# Patient Record
Sex: FEMALE | Race: WHITE | NOT HISPANIC OR LATINO | Employment: FULL TIME | URBAN - METROPOLITAN AREA
[De-identification: names, ages, dates, MRNs, and addresses within clinical notes are randomized per-mention and may not be internally consistent; named-entity substitution may affect disease eponyms.]

---

## 2021-06-08 ENCOUNTER — HOSPITAL ENCOUNTER (EMERGENCY)
Facility: CLINIC | Age: 61
Discharge: HOME | End: 2021-06-08
Attending: EMERGENCY MEDICINE | Admitting: EMERGENCY MEDICINE
Payer: COMMERCIAL

## 2021-06-08 ENCOUNTER — APPOINTMENT (OUTPATIENT)
Dept: GENERAL RADIOLOGY | Facility: CLINIC | Age: 61
End: 2021-06-08
Attending: EMERGENCY MEDICINE
Payer: COMMERCIAL

## 2021-06-08 ENCOUNTER — TELEPHONE (OUTPATIENT)
Dept: ORTHOPEDICS | Facility: CLINIC | Age: 61
End: 2021-06-08

## 2021-06-08 VITALS
DIASTOLIC BLOOD PRESSURE: 86 MMHG | TEMPERATURE: 98.1 F | WEIGHT: 160 LBS | RESPIRATION RATE: 16 BRPM | OXYGEN SATURATION: 98 % | HEART RATE: 59 BPM | SYSTOLIC BLOOD PRESSURE: 136 MMHG
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
## 2021-06-08 LAB
ANION GAP SERPL CALCULATED.3IONS-SCNC: 6 MMOL/L (ref 3–14)
BASOPHILS # BLD AUTO: 0 10E9/L (ref 0–0.2)
BASOPHILS NFR BLD AUTO: 0.2 %
BUN SERPL-MCNC: 18 MG/DL (ref 7–30)
CALCIUM SERPL-MCNC: 9.3 MG/DL (ref 8.5–10.1)
CHLORIDE SERPL-SCNC: 108 MMOL/L (ref 94–109)
CO2 SERPL-SCNC: 22 MMOL/L (ref 20–32)
CREAT SERPL-MCNC: 0.72 MG/DL (ref 0.52–1.04)
DIFFERENTIAL METHOD BLD: NORMAL
EOSINOPHIL # BLD AUTO: 0 10E9/L (ref 0–0.7)
EOSINOPHIL NFR BLD AUTO: 0.3 %
ERYTHROCYTE [DISTWIDTH] IN BLOOD BY AUTOMATED COUNT: 11.9 % (ref 10–15)
GFR SERPL CREATININE-BSD FRML MDRD: >90 ML/MIN/{1.73_M2}
GLUCOSE SERPL-MCNC: 102 MG/DL (ref 70–99)
HCT VFR BLD AUTO: 39.2 % (ref 35–47)
HGB BLD-MCNC: 13.1 G/DL (ref 11.7–15.7)
IMM GRANULOCYTES # BLD: 0 10E9/L (ref 0–0.4)
IMM GRANULOCYTES NFR BLD: 0.3 %
LYMPHOCYTES # BLD AUTO: 2.7 10E9/L (ref 0.8–5.3)
LYMPHOCYTES NFR BLD AUTO: 30.1 %
MCH RBC QN AUTO: 31.3 PG (ref 26.5–33)
MCHC RBC AUTO-ENTMCNC: 33.4 G/DL (ref 31.5–36.5)
MCV RBC AUTO: 94 FL (ref 78–100)
MONOCYTES # BLD AUTO: 0.6 10E9/L (ref 0–1.3)
MONOCYTES NFR BLD AUTO: 6.6 %
NEUTROPHILS # BLD AUTO: 5.6 10E9/L (ref 1.6–8.3)
NEUTROPHILS NFR BLD AUTO: 62.5 %
NRBC # BLD AUTO: 0 10*3/UL
NRBC BLD AUTO-RTO: 0 /100
PLATELET # BLD AUTO: 232 10E9/L (ref 150–450)
POTASSIUM SERPL-SCNC: 3.9 MMOL/L (ref 3.4–5.3)
RBC # BLD AUTO: 4.19 10E12/L (ref 3.8–5.2)
SODIUM SERPL-SCNC: 136 MMOL/L (ref 133–144)
WBC # BLD AUTO: 8.9 10E9/L (ref 4–11)

## 2021-06-08 PROCEDURE — 250N000011 HC RX IP 250 OP 636: Performed by: EMERGENCY MEDICINE

## 2021-06-08 PROCEDURE — 85025 COMPLETE CBC W/AUTO DIFF WBC: CPT | Performed by: EMERGENCY MEDICINE

## 2021-06-08 PROCEDURE — 73140 X-RAY EXAM OF FINGER(S): CPT | Mod: 26 | Performed by: RADIOLOGY

## 2021-06-08 PROCEDURE — 80048 BASIC METABOLIC PNL TOTAL CA: CPT | Performed by: EMERGENCY MEDICINE

## 2021-06-08 PROCEDURE — 96365 THER/PROPH/DIAG IV INF INIT: CPT | Performed by: EMERGENCY MEDICINE

## 2021-06-08 PROCEDURE — 73140 X-RAY EXAM OF FINGER(S): CPT | Mod: RT

## 2021-06-08 PROCEDURE — 99284 EMERGENCY DEPT VISIT MOD MDM: CPT | Mod: 25 | Performed by: EMERGENCY MEDICINE

## 2021-06-08 PROCEDURE — 99284 EMERGENCY DEPT VISIT MOD MDM: CPT | Mod: GC | Performed by: EMERGENCY MEDICINE

## 2021-06-08 RX ORDER — AMPICILLIN AND SULBACTAM 2; 1 G/1; G/1
3 INJECTION, POWDER, FOR SOLUTION INTRAMUSCULAR; INTRAVENOUS ONCE
Status: COMPLETED | OUTPATIENT
Start: 2021-06-08 | End: 2021-06-08

## 2021-06-08 RX ADMIN — AMPICILLIN AND SULBACTAM 3 G: 1; 2 INJECTION, POWDER, FOR SOLUTION INTRAMUSCULAR; INTRAVENOUS at 10:19

## 2021-06-08 ASSESSMENT — ENCOUNTER SYMPTOMS
FEVER: 0
CHILLS: 0
VOMITING: 1
BRUISES/BLEEDS EASILY: 0
NAUSEA: 1
WOUND: 1

## 2021-06-08 NOTE — TELEPHONE ENCOUNTER
Called pt multiple times. Left a VM for call back to schedule with a hand provider. Please schedule pt with Ashlyn Mcgee on Monday when pt calls.           -Sukh, ATC- Orthopedics

## 2021-06-08 NOTE — ED TRIAGE NOTES
Presented to triage with c/o right index finger pain, swelling and redness. Pt was seen at [facility] clinic this morning d/t to cat bite-possible infection. Pt reported she attempted to catch her cat mid air and sustained bite to her finger yesterday at 1:30 pm.

## 2021-06-08 NOTE — ED PROVIDER NOTES
[place] EMERGENCY DEPARTMENT ([facility])  6/08/21  History     Chief Complaint   Patient presents with     Trauma     The history is provided by the patient and medical records.     Carrie Garcia is a 60 year old female with no known significant past medical history who presents to the Emergency Department from Urgent Care for evaluation of a cat bite on her right hand.  Patient reports yesterday around 1:30 PM (less than 24 hours ago) her cat was attempting to grab her food so she caught it mid air during which she sustained a bite from her cat to her right index finger.  Patient states this morning she noticed pain, redness, and swelling to her right index finger.  She states she feels he pain and tenderness has gradually been radiated up her arm, however it currently stops ar her wrist.  Patient reports she has a small amount of drainage from the puncture wound yesterday, however it is mostly scabbed over today.  Patient states she is able to extend her finger, but it is tough to flex it due to the swelling.  Patient denies fevers or chills.  She denies known allergies or history of healing disorders.  She denies previous surgeries on her hand.  Patient is left hand dominant.  Per [facility], her last Tdap was on 11/27/2007, however patient states she thinks she has a more updated vaccine from a workplace.  Of note, patient is flying to [facility] at the end of the week for a vacation.    I have reviewed the Medications, Allergies, Past Medical and Surgical History, and Social History in the [employer] system.  PAST MEDICAL HISTORY: History reviewed. No pertinent past medical history.    PAST SURGICAL HISTORY: History reviewed. No pertinent surgical history.    Past medical history, past surgical history, medications, and allergies were reviewed with the patient. Additional pertinent items: None    FAMILY HISTORY: History reviewed. No pertinent family history.    SOCIAL HISTORY:   Social History     Tobacco Use      Smoking status: Never Smoker     Smokeless tobacco: Never Used   Substance Use Topics     Alcohol use: Yes     Alcohol/week: 1.0 standard drinks     Types: 1 Glasses of wine per week     Comment: 1 per week     Social history was reviewed with the patient. Additional pertinent items: None      Patient's Medications   New Prescriptions    AMOXICILLIN-CLAVULANATE (AUGMENTIN) 875-125 MG TABLET    Take 1 tablet by mouth 2 times daily for 10 days   Previous Medications    No medications on file   Modified Medications    No medications on file   Discontinued Medications    No medications on file        No Known Allergies     Review of Systems   Constitutional: Negative for chills and fever.   Gastrointestinal: Positive for nausea and vomiting.   Skin: Positive for wound (R index finger cat bite).        Pos for redness and swelling of R hand   Allergic/Immunologic: Negative for immunocompromised state.   Hematological: Does not bruise/bleed easily.         Physical Exam   BP: 131/74  Pulse: 68  Temp: 98.1  F (36.7  C)  Resp: 16  Weight: 72.6 kg (160 lb)  SpO2: 98 %      Physical Exam  Gen:A&Ox3, no acute distress  UE: + radial pulse and normal hand perfusion.   Right hand with 3 puncture wounds over radial side of the proximal phalanx. No noted abscesses. Surrounding erythema and soft tissue swelling of 2nd finger and into palm and dorsum of hand with streaking to proximal forearm. No pain with ROM of PIP and minimal pain at MCP. Can fully extend the finger, flexion limited by swelling. Hand and digit sensation intact.                     ED Course   9:43 AM  The patient was seen and examined by Vanesa Bacon MD in Room VTA.       Procedures       Results for orders placed or performed during the hospital encounter of 06/08/21 (from the past 24 hour(s))   CBC with platelets differential   Result Value Ref Range    WBC 8.9 4.0 - 11.0 10e9/L    RBC Count 4.19 3.8 - 5.2 10e12/L    Hemoglobin 13.1 11.7 - 15.7 g/dL     Hematocrit 39.2 35.0 - 47.0 %    MCV 94 78 - 100 fl    MCH 31.3 26.5 - 33.0 pg    MCHC 33.4 31.5 - 36.5 g/dL    RDW 11.9 10.0 - 15.0 %    Platelet Count 232 150 - 450 10e9/L    Diff Method Automated Method     % Neutrophils 62.5 %    % Lymphocytes 30.1 %    % Monocytes 6.6 %    % Eosinophils 0.3 %    % Basophils 0.2 %    % Immature Granulocytes 0.3 %    Nucleated RBCs 0 0 /100    Absolute Neutrophil 5.6 1.6 - 8.3 10e9/L    Absolute Lymphocytes 2.7 0.8 - 5.3 10e9/L    Absolute Monocytes 0.6 0.0 - 1.3 10e9/L    Absolute Eosinophils 0.0 0.0 - 0.7 10e9/L    Absolute Basophils 0.0 0.0 - 0.2 10e9/L    Abs Immature Granulocytes 0.0 0 - 0.4 10e9/L    Absolute Nucleated RBC 0.0    Basic metabolic panel   Result Value Ref Range    Sodium 136 133 - 144 mmol/L    Potassium 3.9 3.4 - 5.3 mmol/L    Chloride 108 94 - 109 mmol/L    Carbon Dioxide 22 20 - 32 mmol/L    Anion Gap 6 3 - 14 mmol/L    Glucose 102 (H) 70 - 99 mg/dL    Urea Nitrogen 18 7 - 30 mg/dL    Creatinine 0.72 0.52 - 1.04 mg/dL    GFR Estimate >90 >60 mL/min/[1.73_m2]    GFR Estimate If Black >90 >60 mL/min/[1.73_m2]    Calcium 9.3 8.5 - 10.1 mg/dL     Medications   ampicillin-sulbactam (UNASYN) 3 g vial to attach to  mL bag (0 g Intravenous Stopped 6/8/21 1051)             Assessments & Plan (with Medical Decision Making)   61 yo F presenting with hand swelling after a cat bite yesterday.   Vitals stable.  Cat belongs to her and is not ill. Recommended monitoring and return for consideration of rabies vaccination if cat becomes ill.   Tetanus immunization up to date.   Exam with significant soft tissue infection but without clear signs of tenosynovitis.   Treated with IV unasyn x1. We discussed ED observation for IV abx overnight vs. Trial of PO abx and close follow up. Pt would prefer outpatient treatment.   Referred to follow up with hand surgery this week.   Started on augmentin BID x10 days.   Home wound care with daily soaks. Does not have area of  abscess today requiring I&D.   CBC, BMP unremarkable.   Xray without bony fractures of visualized foreign bodies.   Discharged.     I have reviewed the nursing notes.    I have reviewed the findings, diagnosis, plan and need for follow up with the patient.    New Prescriptions    AMOXICILLIN-CLAVULANATE (AUGMENTIN) 875-125 MG TABLET    Take 1 tablet by mouth 2 times daily for 10 days       Final diagnoses:   Cat bite, initial encounter   I, Beth Richard, am serving as a trained medical scribe to document services personally performed by Vanesa Bacon MD, based on the provider's statements to me.     I, Vanesa Bacon MD, was physically present and have reviewed and verified the accuracy of this note documented by Beth Richard.     --  Vanesa Bacon MD FACEP  6/8/2021   [facility] EMERGENCY DEPARTMENT     Vanesa Bacon MD  06/08/21 1125

## 2021-06-08 NOTE — DISCHARGE INSTRUCTIONS
Thank you for coming to the [facility] Emergency Department.     Please soak your hand three times daily in warm water. Try to express puss out of wounds after soaking, then pat the hand dry and apply a clean dressing or bandaid.     Return if you have a fever >100.4F, shaking chills/rigors, or clear spreading of the redness and swelling.     You were given a dose of antibiotics in the ER. Take the next dose (pills) this evening. Continue twice daily until the bottle is empty (10 days)    Monitor your cat for signs of illness. If your cat is not vaccinated against rabies and develops illness, you should

## 2021-06-09 ENCOUNTER — OFFICE VISIT (OUTPATIENT)
Dept: ORTHOPEDICS | Facility: CLINIC | Age: 61
End: 2021-06-09
Payer: COMMERCIAL

## 2021-06-09 ENCOUNTER — PRE VISIT (OUTPATIENT)
Dept: ORTHOPEDICS | Facility: CLINIC | Age: 61
End: 2021-06-09

## 2021-06-09 VITALS — WEIGHT: 150 LBS | HEIGHT: 67 IN | BODY MASS INDEX: 23.54 KG/M2
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
## 2021-06-09 DIAGNOSIS — S61.259A CAT BITE OF FINGER, INITIAL ENCOUNTER: Primary | ICD-10-CM

## 2021-06-09 DIAGNOSIS — L03.011 CELLULITIS OF RIGHT INDEX FINGER: ICD-10-CM

## 2021-06-09 PROCEDURE — 99203 OFFICE O/P NEW LOW 30 MIN: CPT | Performed by: PHYSICIAN ASSISTANT

## 2021-06-09 ASSESSMENT — MIFFLIN-ST. JEOR: SCORE: 1288.03

## 2021-06-09 NOTE — LETTER
Date:June 13, 2021      Patient was self referred, no letter generated. Do not send.        [facility]

## 2021-06-09 NOTE — NURSING NOTE
"Reason For Visit:   Chief Complaint   Patient presents with     Consult     Right index finger       Primary MD: No Ref-Primary, Physician      ?  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  Work status?  Full time.  Date of injury: 6/7/21  Type of injury: Cat bite.  Date of surgery: NA  Type of surgery: NA.  Smoker: No  Request smoking cessation information: No      Ht 1.702 m (5' 7\")   Wt 68 kg (150 lb)   BMI 23.49 kg/m        Pain Assessment  Patient Currently in Pain: Yes  0-10 Pain Scale: 3  Primary Pain Location: Finger (Comment which one)               QuickDASH Assessment  No flowsheet data found.       No Known Allergies    Deepika Bland, ATC      "

## 2021-06-09 NOTE — LETTER
6/9/2021         RE: Carrie Garcia  [address] [facility] [address]        Dear Colleague,    Thank you for referring your patient, Carrie Garcia, to the [facility] [place]. Please see a copy of my visit note below.    Hand Surgery History & Physical    REFERRING PHYSICIAN: No ref. provider found   PRIMARY CARE PHYSICIAN: No Ref-Primary, Physician     Chief Complaint:   Consult (Right index finger)      History of Present Illness:     Carrie Garcia is a 60 year old right-hand dominant female who presents for evaluation of right index finger cat bite. This occurred on 6/7/21 when she was bit by her cat when trying to grab her food. She was seen at [facility] Emergency Department  the following day for pain, swelling and drainage from the puncture wound.  She was given a dose of IV Unasyn.  And sent home on a oral course of Augmentin for 10 days.  Examination at that time was not consistent with flexor tenosynovitis.  Recommended that she do daily finger soaks and follow-up in clinic today. The patient has been soaking the finger in epson salt soaks 4-5 times per day. She reports that the swelling and erythema has improved significantly since starting the oral antibiotics yesterday. Patient notes that she will be leaving the country on Friday for a week.     Past Medical History:   No past medical history on file.    Past Surgical History:   No past surgical history on file.    Social History:     Social History     Tobacco Use     Smoking status: Never Smoker     Smokeless tobacco: Never Used   Substance Use Topics     Alcohol use: Yes     Alcohol/week: 1.0 standard drinks     Types: 1 Glasses of wine per week     Comment: 1 per week       Family History:   No family history on file.    Allergies:   No Known Allergies    Medications:     Current Outpatient Medications   Medication     amoxicillin-clavulanate (AUGMENTIN) 875-125 MG tablet     No current facility-administered  medications for this visit.         Review of Systems:     A 10 point ROS was performed and reviewed. Specific responses to these questions are noted at the end of the document.    Physical Exam:   Physical Exam Adult:  General: Well-nourished, alert, cooperative with exam and in no acute distress  HEENT: Atraumatic, normocephalic, extraocular movements intact, moist mucous membranes, trachea midline  Pulmonary: Unlabored work of breathing, on room air  Cardiovascular: Warm and well-perfused extremities. 2+ radial pulses  Skin: Warm, intact without rashes to the upper extremities, head or neck   Gait: Normal  Neuro/psych: Oriented to time, place and person. Affect is appropriate    Musculoskeletal: A focused physical examination of the right index finger reveals:   Inspection - Mild edema to the index finger. Very mild erythema surrounding puncture wounds. Well within lines of demarcation. No active drainage.   Palpation - No tenderness to palpation throughout the finger. No tenderness throughout the flexor tendon.  Neurovascular- intact light touch sensation and motor to distribution of the median, ulnar and radial nerves. Brisk capillary refill to the distal fingers. 2+ radial pulse.   Range of Motion: Able to flex and extend all fingers and thumbs. Index finger flexion slightly limited due to edema, significantly improved per patient. Tolerated passive extension of the finger without pain.                Imaging:   3 view X-ray of the right index finger from 6/8/21 was independently interpreted by me. The results were discussed with the patient.  Findings include: no acute fractures or foreign bodies.     Assessment and Plan:   Assessment:  60 year old female s/p cat bite to the right ring finger on 6/7/21, improving on oral Augmentin. No evidence of flexor tenosynovitis today.      Plan:   -  Recommend continuing warm water soaks 2-3 times daily for 15-20 minutes until the wound if fully healed. Discussed signs  and symptoms of infection such as erythema, drainage, increasing pain/swelling, fever or chills that would prompt a return to the clinic sooner.   - The patient should finish their oral course of Augmentin.   - Recommended follow up on Monday for wound check. However patient will be out of the country. Recommended that she touch base when she returns via mychart to let me know how she is doing. Discussed warning signs of infection that would prompt her to seek care while abroad. Patient expressed understanding.   Knows to contact us in the interim with any questions or concerns.     Saloni Mcgee PA-C   Orthopedic Surgery  6/9/2021 3:14 PM      Again, thank you for allowing me to participate in the care of your patient.        Sincerely,        Saloni Mcgee PA-C

## 2021-06-09 NOTE — PROGRESS NOTES
Hand Surgery History & Physical    REFERRING PHYSICIAN: No ref. provider found   PRIMARY CARE PHYSICIAN: No Ref-Primary, Physician     Chief Complaint:   Consult (Right index finger)      History of Present Illness:     Carrie Garcia is a 60 year old right-hand dominant female who presents for evaluation of right index finger cat bite. This occurred on 6/7/21 when she was bit by her cat when trying to grab her food. She was seen at [facility] Emergency Department  the following day for pain, swelling and drainage from the puncture wound.  She was given a dose of IV Unasyn.  And sent home on a oral course of Augmentin for 10 days.  Examination at that time was not consistent with flexor tenosynovitis.  Recommended that she do daily finger soaks and follow-up in clinic today. The patient has been soaking the finger in epson salt soaks 4-5 times per day. She reports that the swelling and erythema has improved significantly since starting the oral antibiotics yesterday. Patient notes that she will be leaving the country on Friday for a week.     Past Medical History:   No past medical history on file.    Past Surgical History:   No past surgical history on file.    Social History:     Social History     Tobacco Use     Smoking status: Never Smoker     Smokeless tobacco: Never Used   Substance Use Topics     Alcohol use: Yes     Alcohol/week: 1.0 standard drinks     Types: 1 Glasses of wine per week     Comment: 1 per week       Family History:   No family history on file.    Allergies:   No Known Allergies    Medications:     Current Outpatient Medications   Medication     amoxicillin-clavulanate (AUGMENTIN) 875-125 MG tablet     No current facility-administered medications for this visit.         Review of Systems:     A 10 point ROS was performed and reviewed. Specific responses to these questions are noted at the end of the document.    Physical Exam:   Physical Exam Adult:  General: Well-nourished, alert, cooperative  with exam and in no acute distress  HEENT: Atraumatic, normocephalic, extraocular movements intact, moist mucous membranes, trachea midline  Pulmonary: Unlabored work of breathing, on room air  Cardiovascular: Warm and well-perfused extremities. 2+ radial pulses  Skin: Warm, intact without rashes to the upper extremities, head or neck   Gait: Normal  Neuro/psych: Oriented to time, place and person. Affect is appropriate    Musculoskeletal: A focused physical examination of the right index finger reveals:   Inspection - Mild edema to the index finger. Very mild erythema surrounding puncture wounds. Well within lines of demarcation. No active drainage.   Palpation - No tenderness to palpation throughout the finger. No tenderness throughout the flexor tendon.  Neurovascular- intact light touch sensation and motor to distribution of the median, ulnar and radial nerves. Brisk capillary refill to the distal fingers. 2+ radial pulse.   Range of Motion: Able to flex and extend all fingers and thumbs. Index finger flexion slightly limited due to edema, significantly improved per patient. Tolerated passive extension of the finger without pain.                Imaging:   3 view X-ray of the right index finger from 6/8/21 was independently interpreted by me. The results were discussed with the patient.  Findings include: no acute fractures or foreign bodies.     Assessment and Plan:   Assessment:  60 year old female s/p cat bite to the right ring finger on 6/7/21, improving on oral Augmentin. No evidence of flexor tenosynovitis today.      Plan:   -  Recommend continuing warm water soaks 2-3 times daily for 15-20 minutes until the wound if fully healed. Discussed signs and symptoms of infection such as erythema, drainage, increasing pain/swelling, fever or chills that would prompt a return to the clinic sooner.   - The patient should finish their oral course of Augmentin.   - Recommended follow up on Monday for wound check.  However patient will be out of the country. Recommended that she touch base when she returns via mychart to let me know how she is doing. Discussed warning signs of infection that would prompt her to seek care while abroad. Patient expressed understanding.   Knows to contact us in the interim with any questions or concerns.     Saloni Mcgee PA-C   Orthopedic Surgery  6/9/2021 3:14 PM

## 2021-06-09 NOTE — TELEPHONE ENCOUNTER
RECORDS RECEIVED FROM: (R) index finger / [place] [facility] / [facility] / x-rays   DATE RECEIVED: Jun 9, 2021     NOTES STATUS DETAILS   OFFICE NOTE from referring provider Vanesa East MD   OFFICE NOTE from other specialist N/A    DISCHARGE SUMMARY from hospital N/A    DISCHARGE REPORT from the ER Vanesa East MD   OPERATIVE REPORT N/A    MEDICATION LIST Internal    IMPLANT RECORD/STICKER N/A    LABS     CBC/DIFF N/A    CULTURES N/A    INJECTIONS DONE IN RADIOLOGY N/A    MRI N/A    CT SCAN N/A    XRAYS (IMAGES & REPORTS) Internal    TUMOR     PATHOLOGY  Slides & report N/A      06/09/21   9:02 AM   COMPLETE  Arcelia Mariscal CMA

## 2021-07-24 ENCOUNTER — HEALTH MAINTENANCE LETTER (OUTPATIENT)
Age: 61
End: 2021-07-24

## 2021-09-18 ENCOUNTER — HEALTH MAINTENANCE LETTER (OUTPATIENT)
Age: 61
End: 2021-09-18

## 2022-08-20 ENCOUNTER — HEALTH MAINTENANCE LETTER (OUTPATIENT)
Age: 62
End: 2022-08-20

## 2022-11-19 ENCOUNTER — HEALTH MAINTENANCE LETTER (OUTPATIENT)
Age: 62
End: 2022-11-19

## 2022-11-24 ENCOUNTER — APPOINTMENT (OUTPATIENT)
Dept: GENERAL RADIOLOGY | Facility: CLINIC | Age: 62
End: 2022-11-24
Attending: EMERGENCY MEDICINE
Payer: COMMERCIAL

## 2022-11-24 ENCOUNTER — HOSPITAL ENCOUNTER (EMERGENCY)
Facility: CLINIC | Age: 62
Discharge: HOME | End: 2022-11-24
Attending: EMERGENCY MEDICINE | Admitting: EMERGENCY MEDICINE
Payer: COMMERCIAL

## 2022-11-24 VITALS
RESPIRATION RATE: 16 BRPM | HEART RATE: 63 BPM | DIASTOLIC BLOOD PRESSURE: 92 MMHG | SYSTOLIC BLOOD PRESSURE: 150 MMHG | OXYGEN SATURATION: 98 % | TEMPERATURE: 97.9 F

## 2022-11-24 DIAGNOSIS — S93.422A SPRAIN OF DELTOID LIGAMENT OF LEFT ANKLE, INITIAL ENCOUNTER: ICD-10-CM

## 2022-11-24 PROCEDURE — 99282 EMERGENCY DEPT VISIT SF MDM: CPT | Performed by: EMERGENCY MEDICINE

## 2022-11-24 PROCEDURE — 99284 EMERGENCY DEPT VISIT MOD MDM: CPT | Performed by: EMERGENCY MEDICINE

## 2022-11-24 PROCEDURE — 73610 X-RAY EXAM OF ANKLE: CPT | Mod: LT

## 2022-11-24 PROCEDURE — 73610 X-RAY EXAM OF ANKLE: CPT | Mod: 26 | Performed by: RADIOLOGY

## 2022-11-24 ASSESSMENT — ACTIVITIES OF DAILY LIVING (ADL): ADLS_ACUITY_SCORE: 35

## 2022-11-24 NOTE — ED PROVIDER NOTES
[place] EMERGENCY DEPARTMENT ([facility])  November 24, 2022     History     Chief Complaint   Patient presents with     Foot Injury     HPI  Carrie Garcia is a 62 year old female with no significant past medical history on file who presents to the Emergency Department with left ankle injury.  Patient slipped on a patch of snow a short time ago and felt a snap on the inner aspect of her left ankle.  She denies any head injury or loss of consciousness.  She has no numbness or weakness.  She has no nausea or vomiting.  She has no fever or chills.  She is unable to ambulate secondary to the pain.        Past Medical History  No past medical history on file.  No past surgical history on file.  No current outpatient medications on file.    No Known Allergies  Family History  No family history on file.  Social History   Social History     Tobacco Use     Smoking status: Never     Smokeless tobacco: Never   Substance Use Topics     Alcohol use: Yes     Alcohol/week: 1.0 standard drink     Types: 1 Glasses of wine per week     Comment: 1 per week     Drug use: Never      Past medical history, past surgical history, medications, allergies, family history, and social history were reviewed with the patient. No additional pertinent items.       Review of Systems   Constitutional: Negative for chills and fever.   Respiratory: Negative for shortness of breath.    Cardiovascular: Negative for chest pain.   Gastrointestinal: Negative for nausea and vomiting.   Musculoskeletal: Positive for arthralgias and gait problem.   Skin: Negative for wound.   Neurological: Negative for dizziness, weakness, numbness and headaches.       Physical Exam   BP: (!) 150/92  Pulse: 63  Temp: 97.9  F (36.6  C)  Resp: 16  SpO2: 98 %  Physical Exam  Constitutional:       General: She is not in acute distress.     Appearance: She is well-developed and well-nourished.   HENT:      Head: Normocephalic and atraumatic.      Mouth/Throat:       Mouth: Oropharynx is clear and moist.   Eyes:      Conjunctiva/sclera: Conjunctivae normal.      Pupils: Pupils are equal, round, and reactive to light.   Neck:      Thyroid: No thyromegaly.      Trachea: No tracheal deviation.   Cardiovascular:      Rate and Rhythm: Normal rate and regular rhythm.      Pulses: Intact distal pulses.      Heart sounds: Normal heart sounds. No murmur heard.  Pulmonary:      Effort: Pulmonary effort is normal. No respiratory distress.      Breath sounds: Normal breath sounds. No wheezing.   Chest:      Chest wall: No tenderness.   Abdominal:      General: There is no distension.      Palpations: Abdomen is soft.      Tenderness: There is no abdominal tenderness.   Musculoskeletal:         General: Tenderness present. No edema.      Cervical back: Normal range of motion and neck supple.        Feet:    Skin:     General: Skin is warm.      Findings: No rash.   Neurological:      Mental Status: She is alert and oriented to person, place, and time.      Sensory: No sensory deficit.      Motor: Motor strength is normal.   Psychiatric:         Mood and Affect: Mood and affect normal.         Behavior: Behavior normal.       ED Course      Procedures             Results for orders placed or performed during the hospital encounter of 11/24/22   XR Ankle Left G/E 3 Views     Status: None    Narrative    EXAM: XR ANKLE LEFT G/E 3 VIEWS  LOCATION: [facility]  DATE/TIME: 11/24/2022 1:56 PM    INDICATION: Ankle pain since injury.  COMPARISON: None.      Impression    IMPRESSION: Small ossicles adjacent to the medial malleolus from old trauma. No evidence of an acute or subacute fracture. No talar dome osteochondral lesion. Alignment is anatomic. Degenerative changes in the midfoot particularly at the second TMT joint   incidentally noted.     Medications - No data to display     Assessments & Plan (with Medical Decision Making)   Patient is a  62-year-old female who presents for evaluation of a left ankle injury.  This happened a short time ago.  Her primary area of discomfort is along the medial aspect of the left ankle.  She denies knee pain or other injuries.  She has good distal CMS.    Patient's vital signs are normal with exception of mild hypertension.    X-ray showed small ossicles adjacent to the medial malleolus is from old trauma but no evidence for acute or subacute fracture.  Alignment was normal.  Patient will be given a gel brace as well as crutches to aid in ambulation.  She should be nonweightbearing until she follows up with primary care.  If she has ongoing discomfort, one could consider an MRI to evaluate the deltoid ligament.  She did not request anything stronger than Tylenol or ibuprofen as needed for pain.    I have reviewed the nursing notes. I have reviewed the findings, diagnosis, plan and need for follow up with the patient.    There are no discharge medications for this patient.      Final diagnoses:   Sprain of deltoid ligament of left ankle, initial encounter       --  Khoa Cerna MD  [facility] EMERGENCY DEPARTMENT  11/24/2022     Khoa Cerna MD  11/25/22 2274

## 2022-11-24 NOTE — ED TRIAGE NOTES
"Pt arrives ambulatory to triage w/ concern of breaking left ankle. States she slipped on a patch of snow and felt a \"snap\". Medial ankle swollen. Denies hitting head, loss of consciousness. Denies numbness, tingling.       "

## 2022-11-24 NOTE — DISCHARGE INSTRUCTIONS
Ice and elevation  Tylenol as needed for pain  Crutch walking nonweightbearing  Primary care in 1 week if not improving.  Consider MRI to evaluate the deltoid ligament.

## 2022-11-24 NOTE — ED TRIAGE NOTES
Triage Assessment     Row Name 11/24/22 4693       Triage Assessment (Adult)    Airway WDL WDL       Respiratory WDL    Respiratory WDL WDL       Skin Circulation/Temperature WDL    Skin Circulation/Temperature WDL WDL       Cardiac WDL    Cardiac WDL WDL       Peripheral/Neurovascular WDL    Peripheral Neurovascular WDL WDL       Cognitive/Neuro/Behavioral WDL    Cognitive/Neuro/Behavioral WDL WDL

## 2022-11-25 ASSESSMENT — ENCOUNTER SYMPTOMS
WEAKNESS: 0
CHILLS: 0
WOUND: 0
DIZZINESS: 0
NUMBNESS: 0
HEADACHES: 0
VOMITING: 0
FEVER: 0
ARTHRALGIAS: 1
NAUSEA: 0
SHORTNESS OF BREATH: 0

## 2023-09-10 ENCOUNTER — HEALTH MAINTENANCE LETTER (OUTPATIENT)
Age: 63
End: 2023-09-10

## 2024-03-28 ENCOUNTER — HOSPITAL ENCOUNTER (INPATIENT)
Facility: CLINIC | Age: 64
LOS: 1 days | Discharge: HOME | DRG: 316 | End: 2024-03-29
Attending: STUDENT IN AN ORGANIZED HEALTH CARE EDUCATION/TRAINING PROGRAM | Admitting: SURGERY
Payer: COMMERCIAL

## 2024-03-28 ENCOUNTER — APPOINTMENT (OUTPATIENT)
Dept: GENERAL RADIOLOGY | Facility: CLINIC | Age: 64
DRG: 316 | End: 2024-03-28
Attending: EMERGENCY MEDICINE
Payer: COMMERCIAL

## 2024-03-28 ENCOUNTER — APPOINTMENT (OUTPATIENT)
Dept: ULTRASOUND IMAGING | Facility: CLINIC | Age: 64
DRG: 316 | End: 2024-03-28
Attending: EMERGENCY MEDICINE
Payer: COMMERCIAL

## 2024-03-28 ENCOUNTER — APPOINTMENT (OUTPATIENT)
Dept: CT IMAGING | Facility: CLINIC | Age: 64
DRG: 316 | End: 2024-03-28
Attending: STUDENT IN AN ORGANIZED HEALTH CARE EDUCATION/TRAINING PROGRAM
Payer: COMMERCIAL

## 2024-03-28 DIAGNOSIS — R06.02 SHORTNESS OF BREATH: ICD-10-CM

## 2024-03-28 DIAGNOSIS — J18.9 COMMUNITY ACQUIRED PNEUMONIA, UNSPECIFIED LATERALITY: ICD-10-CM

## 2024-03-28 LAB
ANION GAP SERPL CALCULATED.3IONS-SCNC: 11 MMOL/L (ref 7–15)
ATRIAL RATE - MUSE: 47 BPM
BASOPHILS # BLD AUTO: 0 10E3/UL (ref 0–0.2)
BASOPHILS NFR BLD AUTO: 0 %
BUN SERPL-MCNC: 16.1 MG/DL (ref 8–23)
CALCIUM SERPL-MCNC: 9.3 MG/DL (ref 8.8–10.2)
CHLORIDE SERPL-SCNC: 106 MMOL/L (ref 98–107)
CREAT SERPL-MCNC: 0.72 MG/DL (ref 0.51–0.95)
D DIMER PPP FEU-MCNC: 0.54 UG/ML FEU (ref 0–0.5)
DEPRECATED HCO3 PLAS-SCNC: 25 MMOL/L (ref 22–29)
DIASTOLIC BLOOD PRESSURE - MUSE: NORMAL MMHG
EGFRCR SERPLBLD CKD-EPI 2021: >90 ML/MIN/1.73M2
EOSINOPHIL # BLD AUTO: 0 10E3/UL (ref 0–0.7)
EOSINOPHIL NFR BLD AUTO: 0 %
ERYTHROCYTE [DISTWIDTH] IN BLOOD BY AUTOMATED COUNT: 12.1 % (ref 10–15)
GLUCOSE SERPL-MCNC: 110 MG/DL (ref 70–99)
HCT VFR BLD AUTO: 36 % (ref 35–47)
HGB BLD-MCNC: 12.3 G/DL (ref 11.7–15.7)
IMM GRANULOCYTES # BLD: 0.1 10E3/UL
IMM GRANULOCYTES NFR BLD: 0 %
INTERPRETATION ECG - MUSE: NORMAL
LYMPHOCYTES # BLD AUTO: 3.5 10E3/UL (ref 0.8–5.3)
LYMPHOCYTES NFR BLD AUTO: 26 %
MCH RBC QN AUTO: 31.9 PG (ref 26.5–33)
MCHC RBC AUTO-ENTMCNC: 34.2 G/DL (ref 31.5–36.5)
MCV RBC AUTO: 94 FL (ref 78–100)
MONOCYTES # BLD AUTO: 1 10E3/UL (ref 0–1.3)
MONOCYTES NFR BLD AUTO: 8 %
NEUTROPHILS # BLD AUTO: 8.8 10E3/UL (ref 1.6–8.3)
NEUTROPHILS NFR BLD AUTO: 66 %
NRBC # BLD AUTO: 0 10E3/UL
NRBC BLD AUTO-RTO: 0 /100
P AXIS - MUSE: 61 DEGREES
PLATELET # BLD AUTO: 265 10E3/UL (ref 150–450)
POTASSIUM SERPL-SCNC: 4 MMOL/L (ref 3.4–5.3)
PR INTERVAL - MUSE: 128 MS
QRS DURATION - MUSE: 72 MS
QT - MUSE: 448 MS
QTC - MUSE: 396 MS
R AXIS - MUSE: 32 DEGREES
RBC # BLD AUTO: 3.85 10E6/UL (ref 3.8–5.2)
SODIUM SERPL-SCNC: 142 MMOL/L (ref 135–145)
SYSTOLIC BLOOD PRESSURE - MUSE: NORMAL MMHG
T AXIS - MUSE: 33 DEGREES
TROPONIN T SERPL HS-MCNC: 9 NG/L
VENTRICULAR RATE- MUSE: 47 BPM
WBC # BLD AUTO: 13.5 10E3/UL (ref 4–11)

## 2024-03-28 PROCEDURE — 93010 ELECTROCARDIOGRAM REPORT: CPT | Performed by: STUDENT IN AN ORGANIZED HEALTH CARE EDUCATION/TRAINING PROGRAM

## 2024-03-28 PROCEDURE — 71275 CT ANGIOGRAPHY CHEST: CPT | Mod: 26 | Performed by: RADIOLOGY

## 2024-03-28 PROCEDURE — 36415 COLL VENOUS BLD VENIPUNCTURE: CPT | Performed by: EMERGENCY MEDICINE

## 2024-03-28 PROCEDURE — 71275 CT ANGIOGRAPHY CHEST: CPT

## 2024-03-28 PROCEDURE — 36415 COLL VENOUS BLD VENIPUNCTURE: CPT | Performed by: STUDENT IN AN ORGANIZED HEALTH CARE EDUCATION/TRAINING PROGRAM

## 2024-03-28 PROCEDURE — 93971 EXTREMITY STUDY: CPT | Mod: RT

## 2024-03-28 PROCEDURE — 99285 EMERGENCY DEPT VISIT HI MDM: CPT | Mod: 25 | Performed by: STUDENT IN AN ORGANIZED HEALTH CARE EDUCATION/TRAINING PROGRAM

## 2024-03-28 PROCEDURE — 84484 ASSAY OF TROPONIN QUANT: CPT | Performed by: EMERGENCY MEDICINE

## 2024-03-28 PROCEDURE — 71045 X-RAY EXAM CHEST 1 VIEW: CPT | Mod: 26 | Performed by: RADIOLOGY

## 2024-03-28 PROCEDURE — 93971 EXTREMITY STUDY: CPT | Mod: 26 | Performed by: RADIOLOGY

## 2024-03-28 PROCEDURE — 93005 ELECTROCARDIOGRAM TRACING: CPT | Performed by: STUDENT IN AN ORGANIZED HEALTH CARE EDUCATION/TRAINING PROGRAM

## 2024-03-28 PROCEDURE — 85379 FIBRIN DEGRADATION QUANT: CPT | Performed by: EMERGENCY MEDICINE

## 2024-03-28 PROCEDURE — 80048 BASIC METABOLIC PNL TOTAL CA: CPT | Performed by: STUDENT IN AN ORGANIZED HEALTH CARE EDUCATION/TRAINING PROGRAM

## 2024-03-28 PROCEDURE — 250N000011 HC RX IP 250 OP 636: Performed by: STUDENT IN AN ORGANIZED HEALTH CARE EDUCATION/TRAINING PROGRAM

## 2024-03-28 PROCEDURE — 71045 X-RAY EXAM CHEST 1 VIEW: CPT

## 2024-03-28 PROCEDURE — 85025 COMPLETE CBC W/AUTO DIFF WBC: CPT | Performed by: EMERGENCY MEDICINE

## 2024-03-28 RX ORDER — IOPAMIDOL 755 MG/ML
55 INJECTION, SOLUTION INTRAVASCULAR ONCE
Status: COMPLETED | OUTPATIENT
Start: 2024-03-28 | End: 2024-03-28

## 2024-03-28 RX ADMIN — IOPAMIDOL 55 ML: 755 INJECTION, SOLUTION INTRAVENOUS at 22:32

## 2024-03-28 ASSESSMENT — ACTIVITIES OF DAILY LIVING (ADL)
ADLS_ACUITY_SCORE: 35

## 2024-03-28 ASSESSMENT — COLUMBIA-SUICIDE SEVERITY RATING SCALE - C-SSRS
2. HAVE YOU ACTUALLY HAD ANY THOUGHTS OF KILLING YOURSELF IN THE PAST MONTH?: NO
1. IN THE PAST MONTH, HAVE YOU WISHED YOU WERE DEAD OR WISHED YOU COULD GO TO SLEEP AND NOT WAKE UP?: NO
6. HAVE YOU EVER DONE ANYTHING, STARTED TO DO ANYTHING, OR PREPARED TO DO ANYTHING TO END YOUR LIFE?: NO

## 2024-03-29 ENCOUNTER — TELEPHONE (OUTPATIENT)
Dept: CARDIOLOGY | Facility: CLINIC | Age: 64
End: 2024-03-29

## 2024-03-29 ENCOUNTER — APPOINTMENT (OUTPATIENT)
Dept: CT IMAGING | Facility: CLINIC | Age: 64
DRG: 316 | End: 2024-03-29
Attending: EMERGENCY MEDICINE
Payer: COMMERCIAL

## 2024-03-29 VITALS
SYSTOLIC BLOOD PRESSURE: 142 MMHG | BODY MASS INDEX: 25.4 KG/M2 | WEIGHT: 161.82 LBS | HEIGHT: 67 IN | OXYGEN SATURATION: 100 % | TEMPERATURE: 98.4 F | DIASTOLIC BLOOD PRESSURE: 80 MMHG | HEART RATE: 67 BPM | RESPIRATION RATE: 20 BRPM

## 2024-03-29 DIAGNOSIS — I31.39 PERICARDIAL EFFUSION: Primary | ICD-10-CM

## 2024-03-29 PROBLEM — R06.02 SHORTNESS OF BREATH: Status: ACTIVE | Noted: 2024-03-29

## 2024-03-29 PROBLEM — J18.9 COMMUNITY ACQUIRED PNEUMONIA, UNSPECIFIED LATERALITY: Status: ACTIVE | Noted: 2024-03-29

## 2024-03-29 LAB
ANION GAP SERPL CALCULATED.3IONS-SCNC: 10 MMOL/L (ref 7–15)
BASOPHILS # BLD AUTO: 0 10E3/UL (ref 0–0.2)
BASOPHILS NFR BLD AUTO: 0 %
BUN SERPL-MCNC: 13.6 MG/DL (ref 8–23)
CALCIUM SERPL-MCNC: 8.8 MG/DL (ref 8.8–10.2)
CHLORIDE SERPL-SCNC: 109 MMOL/L (ref 98–107)
CREAT SERPL-MCNC: 0.64 MG/DL (ref 0.51–0.95)
DEPRECATED HCO3 PLAS-SCNC: 22 MMOL/L (ref 22–29)
EGFRCR SERPLBLD CKD-EPI 2021: >90 ML/MIN/1.73M2
EOSINOPHIL # BLD AUTO: 0 10E3/UL (ref 0–0.7)
EOSINOPHIL NFR BLD AUTO: 0 %
ERYTHROCYTE [DISTWIDTH] IN BLOOD BY AUTOMATED COUNT: 12.2 % (ref 10–15)
GLUCOSE BLDC GLUCOMTR-MCNC: 90 MG/DL (ref 70–99)
GLUCOSE SERPL-MCNC: 96 MG/DL (ref 70–99)
HCT VFR BLD AUTO: 33.2 % (ref 35–47)
HGB BLD-MCNC: 11.4 G/DL (ref 11.7–15.7)
IMM GRANULOCYTES # BLD: 0 10E3/UL
IMM GRANULOCYTES NFR BLD: 0 %
LYMPHOCYTES # BLD AUTO: 4.3 10E3/UL (ref 0.8–5.3)
LYMPHOCYTES NFR BLD AUTO: 45 %
MCH RBC QN AUTO: 32 PG (ref 26.5–33)
MCHC RBC AUTO-ENTMCNC: 34.3 G/DL (ref 31.5–36.5)
MCV RBC AUTO: 93 FL (ref 78–100)
MONOCYTES # BLD AUTO: 0.6 10E3/UL (ref 0–1.3)
MONOCYTES NFR BLD AUTO: 7 %
NEUTROPHILS # BLD AUTO: 4.6 10E3/UL (ref 1.6–8.3)
NEUTROPHILS NFR BLD AUTO: 48 %
NRBC # BLD AUTO: 0 10E3/UL
NRBC BLD AUTO-RTO: 0 /100
PLATELET # BLD AUTO: 231 10E3/UL (ref 150–450)
POTASSIUM SERPL-SCNC: 3.6 MMOL/L (ref 3.4–5.3)
RBC # BLD AUTO: 3.56 10E6/UL (ref 3.8–5.2)
SODIUM SERPL-SCNC: 141 MMOL/L (ref 135–145)
WBC # BLD AUTO: 9.6 10E3/UL (ref 4–11)

## 2024-03-29 PROCEDURE — 250N000013 HC RX MED GY IP 250 OP 250 PS 637

## 2024-03-29 PROCEDURE — 99207 PR NO BILLABLE SERVICE THIS VISIT: CPT | Performed by: NURSE PRACTITIONER

## 2024-03-29 PROCEDURE — 250N000013 HC RX MED GY IP 250 OP 250 PS 637: Performed by: EMERGENCY MEDICINE

## 2024-03-29 PROCEDURE — 85025 COMPLETE CBC W/AUTO DIFF WBC: CPT

## 2024-03-29 PROCEDURE — G0378 HOSPITAL OBSERVATION PER HR: HCPCS

## 2024-03-29 PROCEDURE — 71275 CT ANGIOGRAPHY CHEST: CPT | Mod: 26 | Performed by: RADIOLOGY

## 2024-03-29 PROCEDURE — 96374 THER/PROPH/DIAG INJ IV PUSH: CPT | Mod: 59

## 2024-03-29 PROCEDURE — 74174 CTA ABD&PLVS W/CONTRAST: CPT

## 2024-03-29 PROCEDURE — 36415 COLL VENOUS BLD VENIPUNCTURE: CPT

## 2024-03-29 PROCEDURE — 250N000011 HC RX IP 250 OP 636: Performed by: EMERGENCY MEDICINE

## 2024-03-29 PROCEDURE — 250N000011 HC RX IP 250 OP 636: Performed by: SURGERY

## 2024-03-29 PROCEDURE — 74174 CTA ABD&PLVS W/CONTRAST: CPT | Mod: 26 | Performed by: RADIOLOGY

## 2024-03-29 PROCEDURE — 258N000003 HC RX IP 258 OP 636: Performed by: EMERGENCY MEDICINE

## 2024-03-29 PROCEDURE — 80048 BASIC METABOLIC PNL TOTAL CA: CPT

## 2024-03-29 PROCEDURE — 200N000002 HC R&B ICU UMMC

## 2024-03-29 RX ORDER — ONDANSETRON 4 MG/1
4 TABLET, ORALLY DISINTEGRATING ORAL EVERY 6 HOURS PRN
Status: DISCONTINUED | OUTPATIENT
Start: 2024-03-29 | End: 2024-03-29 | Stop reason: HOSPADM

## 2024-03-29 RX ORDER — NALOXONE HYDROCHLORIDE 0.4 MG/ML
0.4 INJECTION, SOLUTION INTRAMUSCULAR; INTRAVENOUS; SUBCUTANEOUS
Status: DISCONTINUED | OUTPATIENT
Start: 2024-03-29 | End: 2024-03-29 | Stop reason: HOSPADM

## 2024-03-29 RX ORDER — NALOXONE HYDROCHLORIDE 0.4 MG/ML
0.2 INJECTION, SOLUTION INTRAMUSCULAR; INTRAVENOUS; SUBCUTANEOUS
Status: DISCONTINUED | OUTPATIENT
Start: 2024-03-29 | End: 2024-03-29 | Stop reason: HOSPADM

## 2024-03-29 RX ORDER — AMOXICILLIN 250 MG
2 CAPSULE ORAL 2 TIMES DAILY PRN
Status: DISCONTINUED | OUTPATIENT
Start: 2024-03-29 | End: 2024-03-29

## 2024-03-29 RX ORDER — ACETAMINOPHEN 650 MG/1
650 SUPPOSITORY RECTAL EVERY 4 HOURS PRN
Status: DISCONTINUED | OUTPATIENT
Start: 2024-03-29 | End: 2024-03-29 | Stop reason: HOSPADM

## 2024-03-29 RX ORDER — HYDRALAZINE HYDROCHLORIDE 20 MG/ML
10 INJECTION INTRAMUSCULAR; INTRAVENOUS EVERY 6 HOURS PRN
Status: DISCONTINUED | OUTPATIENT
Start: 2024-03-29 | End: 2024-03-29 | Stop reason: HOSPADM

## 2024-03-29 RX ORDER — HYDRALAZINE HYDROCHLORIDE 20 MG/ML
10 INJECTION INTRAMUSCULAR; INTRAVENOUS ONCE
Status: COMPLETED | OUTPATIENT
Start: 2024-03-29 | End: 2024-03-29

## 2024-03-29 RX ORDER — PROCHLORPERAZINE MALEATE 10 MG
10 TABLET ORAL EVERY 6 HOURS PRN
Status: DISCONTINUED | OUTPATIENT
Start: 2024-03-29 | End: 2024-03-29 | Stop reason: HOSPADM

## 2024-03-29 RX ORDER — NICOTINE POLACRILEX 4 MG
15-30 LOZENGE BUCCAL
Status: DISCONTINUED | OUTPATIENT
Start: 2024-03-29 | End: 2024-03-29 | Stop reason: HOSPADM

## 2024-03-29 RX ORDER — OXYCODONE HYDROCHLORIDE 5 MG/1
5 TABLET ORAL EVERY 4 HOURS PRN
Status: DISCONTINUED | OUTPATIENT
Start: 2024-03-29 | End: 2024-03-29 | Stop reason: HOSPADM

## 2024-03-29 RX ORDER — CALCIUM CARBONATE 500 MG/1
1000 TABLET, CHEWABLE ORAL 4 TIMES DAILY PRN
Status: DISCONTINUED | OUTPATIENT
Start: 2024-03-29 | End: 2024-03-29 | Stop reason: HOSPADM

## 2024-03-29 RX ORDER — IOPAMIDOL 755 MG/ML
90 INJECTION, SOLUTION INTRAVASCULAR ONCE
Status: COMPLETED | OUTPATIENT
Start: 2024-03-29 | End: 2024-03-29

## 2024-03-29 RX ORDER — ONDANSETRON 2 MG/ML
4 INJECTION INTRAMUSCULAR; INTRAVENOUS EVERY 6 HOURS PRN
Status: DISCONTINUED | OUTPATIENT
Start: 2024-03-29 | End: 2024-03-29 | Stop reason: HOSPADM

## 2024-03-29 RX ORDER — AMOXICILLIN 250 MG
2 CAPSULE ORAL 2 TIMES DAILY PRN
Status: DISCONTINUED | OUTPATIENT
Start: 2024-03-29 | End: 2024-03-29 | Stop reason: HOSPADM

## 2024-03-29 RX ORDER — DEXTROSE MONOHYDRATE 25 G/50ML
25-50 INJECTION, SOLUTION INTRAVENOUS
Status: DISCONTINUED | OUTPATIENT
Start: 2024-03-29 | End: 2024-03-29 | Stop reason: HOSPADM

## 2024-03-29 RX ORDER — LIDOCAINE 40 MG/G
CREAM TOPICAL
Status: DISCONTINUED | OUTPATIENT
Start: 2024-03-29 | End: 2024-03-29 | Stop reason: HOSPADM

## 2024-03-29 RX ORDER — PROCHLORPERAZINE 25 MG
25 SUPPOSITORY, RECTAL RECTAL EVERY 12 HOURS PRN
Status: DISCONTINUED | OUTPATIENT
Start: 2024-03-29 | End: 2024-03-29 | Stop reason: HOSPADM

## 2024-03-29 RX ORDER — ACETAMINOPHEN 325 MG/1
650 TABLET ORAL ONCE
Status: COMPLETED | OUTPATIENT
Start: 2024-03-29 | End: 2024-03-29

## 2024-03-29 RX ORDER — LABETALOL HYDROCHLORIDE 5 MG/ML
20 INJECTION, SOLUTION INTRAVENOUS EVERY 6 HOURS PRN
Status: DISCONTINUED | OUTPATIENT
Start: 2024-03-29 | End: 2024-03-29 | Stop reason: HOSPADM

## 2024-03-29 RX ORDER — ACETAMINOPHEN 325 MG/1
650 TABLET ORAL EVERY 4 HOURS PRN
Status: DISCONTINUED | OUTPATIENT
Start: 2024-03-29 | End: 2024-03-29 | Stop reason: HOSPADM

## 2024-03-29 RX ORDER — DOXYCYCLINE 100 MG/1
100 CAPSULE ORAL 2 TIMES DAILY
Qty: 14 CAPSULE | Refills: 0 | Status: SHIPPED | OUTPATIENT
Start: 2024-03-29 | End: 2024-03-29

## 2024-03-29 RX ORDER — AMOXICILLIN 250 MG
1 CAPSULE ORAL 2 TIMES DAILY PRN
Status: DISCONTINUED | OUTPATIENT
Start: 2024-03-29 | End: 2024-03-29 | Stop reason: HOSPADM

## 2024-03-29 RX ORDER — AMOXICILLIN 250 MG
1 CAPSULE ORAL 2 TIMES DAILY PRN
Status: DISCONTINUED | OUTPATIENT
Start: 2024-03-29 | End: 2024-03-29

## 2024-03-29 RX ADMIN — SODIUM CHLORIDE 1000 ML: 9 INJECTION, SOLUTION INTRAVENOUS at 02:04

## 2024-03-29 RX ADMIN — ACETAMINOPHEN 650 MG: 325 TABLET, FILM COATED ORAL at 02:04

## 2024-03-29 RX ADMIN — HYDRALAZINE HYDROCHLORIDE 10 MG: 20 INJECTION INTRAMUSCULAR; INTRAVENOUS at 02:23

## 2024-03-29 RX ADMIN — IOPAMIDOL 90 ML: 755 INJECTION, SOLUTION INTRAVENOUS at 03:48

## 2024-03-29 RX ADMIN — ACETAMINOPHEN 650 MG: 325 TABLET, FILM COATED ORAL at 08:23

## 2024-03-29 ASSESSMENT — ACTIVITIES OF DAILY LIVING (ADL)
ADLS_ACUITY_SCORE: 36
ADLS_ACUITY_SCORE: 35
ADLS_ACUITY_SCORE: 35
ADLS_ACUITY_SCORE: 36
ADLS_ACUITY_SCORE: 35
ADLS_ACUITY_SCORE: 35
ADLS_ACUITY_SCORE: 36

## 2024-03-29 ASSESSMENT — VISUAL ACUITY: OU: BASELINE

## 2024-03-29 NOTE — H&P
[facility] Surgery Consultation    Carrie Garcia MRN# [id number]   Age: 63 year old YOB: 1960     Date of Admission:  3/28/2024    Date of Consult:   3/28/24    Reason for consult: Concerns for thoracic aortic dissection       Requesting service: ED; requesting provider: Dr. Whaley                   Assessment and Plan:   Assessment:  Ms. Carrie Garcia is a 62 yo female with non relevant PMHx who has undergone a let sided, arthroscopic rotator cuff repair on 3/27 at [facility]. Today, she came to our ED due to SOB and leg pain. CT PE showing a new small pericardial effusion along the anterior aspect of the ascending thoracic aorta in the pericardial recess. Labs are unremarkable except for a leukocytosis of 13.5. Patient hemodynamically stable but hypertensive, saturating >92% on RA.       Plan:  - Admit to CVICU for observation  - Imaging to be done in the morning  - Please call with further questions or concerns      Patient discussed with fellow, Dr. Quan, and staff, Dr. Reyes.    Bandar Carrion MD  PGY2  Dept. of  Surgery          Chief Complaint:     Shortness of breath and leg pain          History of Present Illness:     Ms. Carrie Garcia is a 62 yo female with non relevant PMHx who has undergone a let sided, arthroscopic rotator cuff repair on 3/27 at [facility]. Today, she came to our ED due to SOB and leg pain. While in the ED, patient underwent CT PE with concerns of a new small amount of pericardial effusion along the anterior aspect of the ascending thoracic aorta in the pericardial recess. Labs are unremarkable except for a leukocytosis of 13.5. Further workup negative for DVT or PE. Patient hemodynamically stable but hypertensive, saturating >92% on RA.     Per patient, she start feeling mild shortness of breath couple hours after surgery but was not concerned. Yesterday, patient went for a walk and she noticed increased SOB and right popliteal  pain, and thus came to the ED. Currently, patient with left sided sling in place with shoulder covered with dressing, no pain on her chest and heart sounds are easily audible and with normal rhythm without additional sounds. Lungs are clear on auscultation as well.             Past Medical History:   No past medical history on file.            Past Surgical History:   No past surgical history on file.          Social History:     Social History     Tobacco Use    Smoking status: Never    Smokeless tobacco: Never   Substance Use Topics    Alcohol use: Yes     Alcohol/week: 1.0 standard drink of alcohol     Types: 1 Glasses of wine per week     Comment: 1 per week          Family History:   No family history on file.               Allergies:   No Known Allergies          Medications:     No current facility-administered medications for this encounter.     No current outpatient medications on file.          Review of Systems:     Complete review of systems negative except as documented in HPI          Physical Exam:   All vitals have been reviewed  Temp:  [97.7  F (36.5  C)] 97.7  F (36.5  C)  Pulse:  [61] 61  Resp:  [16] 16  BP: (154-170)/(85-91) 154/85  SpO2:  [98 %] 98 %  No intake or output data in the 24 hours ending 03/28/24 8528    Physical Exam:   General: Alert, well-appearing  in no acute distress.  Neuro: A&Ox3  HEENT: Normocephalic, atraumatic.   Neck: No cervical lymphadenopathy. No thyromegaly.   Respiratory: Non-labored breathing. Lung sounds clear to auscultation bilaterally without rhonchi, crackles or wheeze  Cardiovascular: Regular rate and rhythm. No tachycardia. Strong radial pulse  Gastrointestinal: Abdomen soft, non-distended, non-tender to palpation. No organomegaly or masses appreciated. No inguinal hernias.   MSK/Extremities: Left sided extremity with sling in place with left shoulder covered with dressings          Data:   All laboratory data reviewed    Results:  BMP  Recent Labs   Lab  03/28/24 1958      POTASSIUM 4.0   CHLORIDE 106   CO2 25   BUN 16.1   CR 0.72   *     CBC  Recent Labs   Lab 03/28/24 1958   WBC 13.5*   HGB 12.3        LFTNo lab results found in last 7 days.  Recent Labs   Lab 03/28/24 1958   *           Imaging:  CT PE 3/28/2024:  1.  No CT evidence of pulmonary embolism.  2.  Mild to moderate atelectasis or consolidation posterior to the left hemidiaphragm in the left lower lung. Clinical correlation for related symptomatology recommended.  3.  Small amount of pericardial effusion along the anterior aspect of the ascending thoracic aorta in the pericardial recess. No evidence for ascending thoracic aortic aneurysm.    DVT US: Negative

## 2024-03-29 NOTE — DISCHARGE SUMMARY
[facility]  Discharge Summary    Date of Admission: 3/28/2024  Date of Discharge: 3/29/2024  Attending Physician: Speedy Soliz MD  Discharging Provider: Pam George NP  Date of Service (when I saw the patient): 03/29/24    Primary Provider: Anamaria Ref-Primary, Physician  Primary Care clinic: No address on file  Phone: None  Fax number: [phone]     Discharge Diagnoses   Concern for aortic dissection: non-specific fluid in superior aortic recess    Hospital Course   Mrs. Garcia is a 63 year old female with no relevant PMH who underwent rotator cuff repair on 3/27/24 who presented to the ED on 3/28/24 with shortness of breath, pleuritic chest pain localized to inferior left breast, and right calf pain. She was found to be hypertensive to 170s. LE US was negative for DVT. CT chest was negative for PE but showed a small pericardial effusion along the anterior aspect of the ascending thoracic aorta. Thoracic surgery was consulted with concern for aortic dissection. She was admitted to the ICU for further monitoring and imaging. A CT angio was done this morning and negative for aortic dissection, but re-demonstrated the fluid in the superior aortic recess and showed small left lower lobe opacification. By morning her shortness of breath and pleuritic pain had resolved. She denied other chest pains and she was normotensive. She was advised of the <5% chance of a missed aortic dissection and that Dr. Reyes would like repeat imaging and clinic visit in 3 weeks.      There was also a read of consolidation in her left lower lobe on the chest CT. Given her shortness of breath improved without treatment and she was without cough, fever, chills, or malaise, we opted to defer antibiotic prescription and advised her to be seen if she develops those symptoms.    Significant Results and Procedures   None    Code Status   Full Code    SUBJECTIVE: Shortness of breath and pleuritic  "chest pain improved. No other chest pains. Denies nausea/vomiting. No BM or flatus this morning. Left arm still numb from interscalene block done for shoulder surgery.     Physical Exam   Temp: 98.4  F (36.9  C) Temp src: Oral BP: (!) 142/80 Pulse: 67   Resp: 20 SpO2: 100 % O2 Device: None (Room air)    Vitals:    03/28/24 1921 03/29/24 0415   Weight: 68 kg (150 lb) 73.4 kg (161 lb 13.1 oz)     Vital Signs with Ranges  Temp:  [97.7  F (36.5  C)-98.4  F (36.9  C)] 98.4  F (36.9  C)  Pulse:  [54-75] 67  Resp:  [12-22] 20  BP: (113-170)/(75-91) 142/80  SpO2:  [94 %-100 %] 100 %  No intake/output data recorded.    General: Awake, alert  Neuro: Oriented x4,  strength equal bilaterally  Pulm: Lung sounds clear posteriorly and anteriorly  Cardiac: S1/S2 without m/r/g; pedal pulses 2+ without LE edema  GI: Abdomen soft  : Voiding independently, urine not assessed  MSK: Left shoulder in brace  Skin: No rashes noted    Discharge Disposition   Discharged to home  Condition at discharge: Good  Discharge VS: Blood pressure (!) 142/80, pulse 67, temperature 98.4  F (36.9  C), temperature source Oral, resp. rate 20, height 1.702 m (5' 7\"), weight 73.4 kg (161 lb 13.1 oz), SpO2 100%.    Consultations This Hospital Stay   None    Discharge Orders      Reason for your hospital stay    Shortness of breath and pleuritic chest pain; chest CT incidentally found fluid around aorta and prompted concern for dissection. Dr. Reyes would like you to follow-up with him and a CT again in 3 weeks.     Activity    Your activity upon discharge: ambulate as tolerated. Shoulder restrictions per your orthopedic surgeon.     Follow Up and recommended labs and tests    Follow-up with Dr. Reyes and a CTA in 3 weeks. His  should reach out to schedule those appointments. His clinic number is [phone] if you don't hear from the  in the coming week. Otherwise follow-up with your primary care provider as needed.     Diet "    Follow this diet upon discharge: Regular     Discharge Medications   There are no discharge medications for this patient.    Allergies   No Known Allergies  Data   Most Recent 3 CBC's:  Recent Labs   Lab Test 03/29/24  0604 03/28/24 1958 06/08/21  0932   WBC 9.6 13.5* 8.9   HGB 11.4* 12.3 13.1   MCV 93 94 94    265 232      Most Recent 3 BMP's:  Recent Labs   Lab Test 03/29/24  0605 03/29/24  0402 03/28/24 1958 06/08/21  0932     --  142 136   POTASSIUM 3.6  --  4.0 3.9   CHLORIDE 109*  --  106 108   CO2 22  --  25 22   BUN 13.6  --  16.1 18   CR 0.64  --  0.72 0.72   ANIONGAP 10  --  11 6   CARL 8.8  --  9.3 9.3   GLC 96 90 110* 102*     3/28/24 Chest CT PE with contrast IMPRESSION:  1.  No CT evidence of pulmonary embolism.  2.  Mild to moderate atelectasis or consolidation posterior to the left hemidiaphragm in the left lower lung. Clinical correlation for related symptomatology recommended.  3.  Small amount of pericardial effusion along the anterior aspect of the ascending thoracic aorta in the pericardial recess. No evidence for ascending thoracic aortic aneurysm.    3/29/24 CTA chest/abdomen/pelvis with contrast IMPRESSION:  1. Non specific fluid in the superior aortic recess is non specific.  Differential includes physiologic, inflammatory, infectious, and  neoplastic etiologies. Correlate clinically.  2. No aortic dissection.  3. Left lower lobe consolidation. Correlate for pneumonia.    Time Spent on this Encounter   I, FABI Mcgee CNP, personally saw the patient today and spent greater than 30 minutes discharging this patient.    We appreciate the opportunity to care for your patient while in the hospital.     FABI Mcgee CNP

## 2024-03-29 NOTE — ED NOTES
"     Emergency Department Patient Sign-out       Brief HPI and ED course:  Patient is a 63 year old female signed out to me by Dr. Whaley.  See initial ED Provider note for details of the presentation. In brief, patient with leg pain and SOB after recent surgery. CT PA negative for PE but shows likely pneumonia and pericardial effusion. Thoracic surgery consulted in case of possible aortic dissection. Doxycycline prescription prepared in case of discharge.     Vitals:   Patient Vitals for the past 24 hrs:   BP Temp Temp src Pulse Resp SpO2 Height Weight   03/28/24 2039 (!) 154/85 -- -- -- -- -- -- --   03/28/24 1921 (!) 170/91 97.7  F (36.5  C) Oral 61 16 98 % 1.702 m (5' 7\") 68 kg (150 lb)     Received Sign-out Plan:    Pending studies include: thoracic surgery consult      Plan:   - f/u thoracic recs    Medical Decision Making  The patient's presentation was of high complexity (an acute health issue posing potential threat to life or bodily function).    The patient's evaluation involved:  ordering and/or review of 3+ test(s) in this encounter (see separate area of note for details)  independent interpretation of testing performed by another health professional (CT PA)  discussion of management or test interpretation with another health professional (cardiac surgery resident)    The patient's management necessitated high risk (a decision regarding hospitalization).      Events after assuming care:  After care was assumed, a focused history and physical was performed. Agree with findings relayed by previous provider.     Personally reviewed the CT pulmonary angiogram which does show fluid around the thoracic aorta, but not clearly around the heart.  Bedside cardiac ultrasound performed and showed no evidence of pericardial effusion, had grossly normal LV contractility and no RV dilation.    Patient did note having a nerve block in her neck 1.5 days ago, prior to her shoulder surgery.  Care Everywhere review does " show that she had an interscalene block performed with 20 mL of bupivacaine.  It is possible that the fluid settled down in the mediastinum depending on which fascial plane it was actually injected into but unclear how likely that is at this time.  Patient was without any episodes of chest pain suggestive of aortic dissection.    CT angio with aortic timing ordered. Hydralazine given for elevated blood pressure with improvement, beta-blockers deferred given bradycardia. Case discussed with thoracic/cardiac surgery resident, who recommended admission to the CVICU.  Patient admitted.     --  Felipe Garvey MD   Emergency Medicine         Felipe Garvey MD  03/29/24 1259

## 2024-03-29 NOTE — H&P
"[facility]    ICU History and Physical    Primary Team: CVICU  Reason for Critical Care Admission: Suspect periaortic fluid  Admitting Physician: Niall Reyes MD  Date of Admission:  3/28/2024    Assessment: Critical Care   Ms. Carrie Garcia is a 64 yo female with non relevant PMHx who has undergone a let sided, arthroscopic rotator cuff repair on 3/27 at [facility] [place], [place]. Today, she came to ED due to SOB and leg pain. CT PE showing a new small pericardial effusion along the anterior aspect of the ascending thoracic aorta in the pericardial recess. Labs are unremarkable except leukocytosis of 13.5. Patient hemodynamically stable.     Plan: Critical Care   Neuro/ pain/ sedation:  # Left shoulder pain  - Regional block given 3/27  - tylenol PRN    Pulmonary:  # No acute issues  -IS    Cardiovascular:  # Elevated BP in ED 2/2 anxiety  # Small pericardial effusion  Echo done  CTA chest 0411  RESIDENT PRELIMINARY INTERPRETATION  IMPRESSION:  1.  No evidence of contrast extravasation about the ascending thoracic  aorta. Small volume fluid within the pericardial recesses is favored  physiologic.  2.  Patent vasculature throughout the chest, abdomen and pelvis.    GI/Nutrition:  - Diet: NPO for Medical/Clinical Reasons Except for: Meds, Ice Chips      Renal/ Fluid Balance:   # No acute issues    MSK:   - PT per outpatient recs     Lines/ tubes/ drains:  Velasco Catheter: Not present  Lines: None       Prophylaxis:  - DVT Prophylaxis: Low Risk/Ambulatory with no VTE prophylaxis indicated  - PUD Prophylaxis: NA    Code Status: Full Code      Disposition:  Observation    Care time exclusive of procedures: 35 minutes    Clinically Significant Risk Factors Present on Admission                       # Overweight: Estimated body mass index is 25.34 kg/m  as calculated from the following:    Height as of this encounter: 1.702 m (5' 7\").    Weight as of this encounter: 73.4 " "kg (161 lb 13.1 oz).                  FABI Chavez [facility]  Securely message with [employer] (more info)  Text page via [employer] Paging/Directory   ___________________________________________________________________    Chief Complaint   Reports she has some leg and left sided chest pain with inspiration, presented to ED because she was \"worried about a PE and I don't want to die.\"  While in the ED, patient underwent CT PE with concerns of a new small amount of pericardial effusion along the anterior aspect of the ascending thoracic aorta in the pericardial recess. Labs are unremarkable except for a leukocytosis of 13.5. Further workup negative for DVT or PE. Patient hemodynamically stable but hypertensive, saturating >92% on RA.      Per patient, she start feeling mild shortness of breath couple hours after surgery but was not concerned. Yesterday, patient went for a walk and she noticed increased SOB and right popliteal pain, and thus came to the ED. Currently, patient with left sided sling in place with shoulder covered with dressing, no pain on her chest and heart sounds are easily audible and with normal rhythm without additional sounds. Lungs are clear on auscultation as well.     History is obtained from the patient    Review of Systems    The 10 point Review of Systems is negative other than noted in the HPI or here.     Past Medical History    I have reviewed this patient's medical history and updated it with pertinent information if needed.   No past medical history on file.    Past Surgical History   I have reviewed this patient's surgical history and updated it with pertinent information if needed.  3/27/24 Left rotator cuff repair    Social History   I have reviewed this patient's social history and updated it with pertinent information if needed.  Social History     Tobacco Use    Smoking status: Never    Smokeless tobacco: Never   Substance Use " Topics    Alcohol use: Yes     Alcohol/week: 1.0 standard drink of alcohol     Types: 1 Glasses of wine per week     Comment: 1 per week    Drug use: Never       Family History         Prior to Admission Medications   None     Allergies   No Known Allergies    Physical Exam   Vital Signs: Temp: 98.1  F (36.7  C) Temp src: Oral BP: 114/81 Pulse: 58   Resp: 13 SpO2: 94 % O2 Device: None (Room air)    Weight: 161 lbs 13.08 oz    GEN: well appearing  EYES: PERRL, Anicteric sclera.   HEENT:  Normocephalic, atraumatic  CV: RRR, no gallops, rubs, or murmurs  PULM/CHEST: Clear breath sounds bilaterally without rhonchi, crackles or wheeze, symmetric chest rise  GI: normal bowel sounds, soft, non-tender, no rebound tenderness or guarding, no masses  EXTREMITIES: no peripheral edema, moving all extremities, peripheral pulses intact  NEURO: Cranial nerves II-XII grossly intact, no motor-sensory deficits noted  SKIN: Left arm in sling  PSYCH:  Affect: appropriate     Data   I reviewed all medications, new labs and imaging results over the last 24 hours.  Arterial Blood Gases   No lab results found in last 7 days.    Complete Blood Count   Recent Labs   Lab 03/28/24 1958   WBC 13.5*   HGB 12.3          Basic Metabolic Panel  Recent Labs   Lab 03/29/24  0402 03/28/24 1958   NA  --  142   POTASSIUM  --  4.0   CHLORIDE  --  106   CO2  --  25   BUN  --  16.1   CR  --  0.72   GLC 90 110*       Liver Function Tests  No lab results found in last 7 days.    Pancreatic Enzymes  No lab results found in last 7 days.    Coagulation Profile  No lab results found in last 7 days.    IMAGING:  Recent Results (from the past 24 hour(s))   XR Chest 1 View    Narrative    EXAM: XR CHEST 1 VIEW  LOCATION: [facility]  DATE: 3/28/2024    INDICATION: Shortness of breath.  COMPARISON: None.      Impression    IMPRESSION: Heart size and vascularity are normal. Calcified lymph nodes right hilar region.  Left basilar subsegmental atelectasis. No focal consolidation nor pleural effusion.   US Lower Extremity Venous Duplex Right    Impression    RESIDENT PRELIMINARY INTERPRETATION  IMPRESSION:  No evidence of right lower extremity deep venous thrombosis.     CT Chest Pulmonary Embolism w Contrast    Narrative    EXAM: CT CHEST PULMONARY EMBOLISM W CONTRAST  LOCATION: [facility]  DATE: 3/28/2024    INDICATION: Short of breath. Chest pain. r o PE  COMPARISON: None.  TECHNIQUE: CT chest pulmonary angiogram during arterial phase injection of IV contrast. Multiplanar reformats and MIP reconstructions were performed. Dose reduction techniques were used.   CONTRAST: iopamidol (ISOVUE 370) solution 55 mL    FINDINGS:  ANGIOGRAM CHEST: Pulmonary arteries are normal caliber and negative for pulmonary emboli. Thoracic aorta is not well opacified and is  indeterminate for dissection. No CT evidence of right heart strain.    LUNGS AND PLEURA: Mild to moderate atelectasis or consolidation posterior to the left hemidiaphragm in the left lower lung. Otherwise, Lungs are clear. No pleural effusion.    MEDIASTINUM/AXILLAE: No lymphadenopathy. Normal esophagus. Small amount of anterior pericardial effusion along the ascending thoracic aorta in the pericardial recess. No evidence for ascending thoracic aortic aneurysm.    CORONARY ARTERY CALCIFICATION: None.    UPPER ABDOMEN: No concerning upper abdominal findings.    MUSCULOSKELETAL: No concerning osseous abnormalities.      Impression    IMPRESSION:  1.  No CT evidence of pulmonary embolism.  2.  Mild to moderate atelectasis or consolidation posterior to the left hemidiaphragm in the left lower lung. Clinical correlation for related symptomatology recommended.  3.  Small amount of pericardial effusion along the anterior aspect of the ascending thoracic aorta in the pericardial recess. No evidence for ascending thoracic aortic aneurysm.      POC US ECHO LIMITED    Impression    Limited Bedside ED Cardiac Ultrasound  PROCEDURE: PERFORMED BY: Dr. Felipe Garvey MD  INDICATIONS/SYMPTOM:  Abnormal Mediastinum on CXR  PROBE: Cardiac phased array probe  BODY LOCATION: Chest (cardiac), thoracic aortic arch  FINDINGS: The ultrasound was performed utilizing the subcostal, parasternal long axis, and parasternal short axis views.  Grossly normal LV contractility. No RV dilation visualized. No pericardial effusion visualized. Aortic arch view without visualized dissection flap.   INTERPRETATION:    Grossly normal LV contractility. No pericardial effusion. No RV dilation. No dissection flap visualized at the aortic arch (ultrasound not sufficient sensitivity to rule out dissection).  IMAGE DOCUMENTATION: Images were archived to PACs system.     CTA Chest Abdomen Pelvis w Contrast    Impression    RESIDENT PRELIMINARY INTERPRETATION  IMPRESSION:  1.  No evidence of contrast extravasation about the ascending thoracic  aorta. Small volume fluid within the pericardial recesses is favored  physiologic.  2.  Patent vasculature throughout the chest, abdomen and pelvis.

## 2024-03-29 NOTE — PROGRESS NOTES
Admitted/transferred from: Emergency department  Reason for admission/transfer: CVTS/observation  2 RN skin assessment: Melvina Brito, RN and Malini Velez, RN - unable to be completed fully due to patient's L arm/shoulder sling which cannot be removed due to recent rotator cuff surgery. L arm sling in place with gauz dressing on shoulder intact, no drainage on dressing. Pt denies other skin issues at this time.   Result of skin assessment and interventions/actions: N/a  Height, weight, drug calc weight: Done  Patient belongings (see Flowsheet) - to remain with pt in room   MDRO education added to care plan: N/A  ?

## 2024-03-29 NOTE — ED PROVIDER NOTES
"    [place] EMERGENCY DEPARTMENT ([facility])    3/28/24       ED PROVIDER NOTE   History     Chief Complaint   Patient presents with    Shortness of Breath    Leg Pain     HPI  Carrie Garcia is a 63 year old female with a past medical history of s/p left rotator cuff repair (3/27/24) who presents to the ED for evaluation of shortness of breath and leg pain. Patient reports experiencing pain in her right calf for the past few hours and shortness of breath since the procedure. Patient states the shortness of breath has worsened today. Denies fevers or chills. Denies any medical problems. Patient reports she is currently taking ibuprofen and aspirin.    Past Medical History  No past medical history on file.  No past surgical history on file.  doxycycline hyclate (VIBRAMYCIN) 100 MG capsule      No Known Allergies  Family History  No family history on file.  Social History   Social History     Tobacco Use    Smoking status: Never    Smokeless tobacco: Never   Substance Use Topics    Alcohol use: Yes     Alcohol/week: 1.0 standard drink of alcohol     Types: 1 Glasses of wine per week     Comment: 1 per week    Drug use: Never         A medically appropriate review of systems was performed with pertinent positives and negatives noted in the HPI, and all other systems negative.    Physical Exam   BP: (!) 170/91  Pulse: 61  Temp: 97.7  F (36.5  C)  Resp: 16  Height: 170.2 cm (5' 7\")  Weight: 68 kg (150 lb)  SpO2: 98 %  Physical Exam  Constitutional:       General: She is not in acute distress.     Appearance: Normal appearance. She is not diaphoretic.      Interventions: She is not intubated.  HENT:      Head: Atraumatic.      Mouth/Throat:      Mouth: Mucous membranes are moist.   Eyes:      General: No scleral icterus.     Conjunctiva/sclera: Conjunctivae normal.   Cardiovascular:      Rate and Rhythm: Normal rate and regular rhythm. No extrasystoles are present.     Pulses: No decreased pulses.      Heart " sounds: Normal heart sounds. No murmur heard.     No friction rub. No gallop.   Pulmonary:      Effort: No tachypnea, bradypnea, accessory muscle usage or respiratory distress. She is not intubated.      Breath sounds: Normal breath sounds. No stridor.   Abdominal:      General: Abdomen is flat.   Musculoskeletal:      Cervical back: Neck supple.   Skin:     General: Skin is warm.      Findings: No rash.   Neurological:      Mental Status: She is alert.           ED Course, Procedures, & Data      Procedures            EKG Interpretation:      Interpreted by Judd Julian MD  Time reviewed: 19:34:56  Symptoms at time of EKG: Shortness of breath and chest pain    Rhythm: Sinus bradycardia  Rate: 47 bpm    Comparison to prior: No old EKG available    Clinical Impression: Sinus bradycardia, otherwise normal ECG          Results for orders placed or performed during the hospital encounter of 03/28/24   US Lower Extremity Venous Duplex Right     Status: None (Preliminary result)    Impression    RESIDENT PRELIMINARY INTERPRETATION  IMPRESSION:  No evidence of right lower extremity deep venous thrombosis.     XR Chest 1 View     Status: None    Narrative    EXAM: XR CHEST 1 VIEW  LOCATION: [facility]  DATE: 3/28/2024    INDICATION: Shortness of breath.  COMPARISON: None.      Impression    IMPRESSION: Heart size and vascularity are normal. Calcified lymph nodes right hilar region. Left basilar subsegmental atelectasis. No focal consolidation nor pleural effusion.   CT Chest Pulmonary Embolism w Contrast     Status: None    Narrative    EXAM: CT CHEST PULMONARY EMBOLISM W CONTRAST  LOCATION: [facility]  DATE: 3/28/2024    INDICATION: Short of breath. Chest pain. r o PE  COMPARISON: None.  TECHNIQUE: CT chest pulmonary angiogram during arterial phase injection of IV contrast. Multiplanar reformats and MIP reconstructions were  performed. Dose reduction techniques were used.   CONTRAST: iopamidol (ISOVUE 370) solution 55 mL    FINDINGS:  ANGIOGRAM CHEST: Pulmonary arteries are normal caliber and negative for pulmonary emboli. Thoracic aorta is not well opacified and is  indeterminate for dissection. No CT evidence of right heart strain.    LUNGS AND PLEURA: Mild to moderate atelectasis or consolidation posterior to the left hemidiaphragm in the left lower lung. Otherwise, Lungs are clear. No pleural effusion.    MEDIASTINUM/AXILLAE: No lymphadenopathy. Normal esophagus. Small amount of anterior pericardial effusion along the ascending thoracic aorta in the pericardial recess. No evidence for ascending thoracic aortic aneurysm.    CORONARY ARTERY CALCIFICATION: None.    UPPER ABDOMEN: No concerning upper abdominal findings.    MUSCULOSKELETAL: No concerning osseous abnormalities.      Impression    IMPRESSION:  1.  No CT evidence of pulmonary embolism.  2.  Mild to moderate atelectasis or consolidation posterior to the left hemidiaphragm in the left lower lung. Clinical correlation for related symptomatology recommended.  3.  Small amount of pericardial effusion along the anterior aspect of the ascending thoracic aorta in the pericardial recess. No evidence for ascending thoracic aortic aneurysm.     Troponin T, High Sensitivity     Status: Normal   Result Value Ref Range    Troponin T, High Sensitivity 9 <=14 ng/L   D dimer quantitative     Status: Abnormal   Result Value Ref Range    D-Dimer Quantitative 0.54 (H) 0.00 - 0.50 ug/mL FEU    Narrative    This D-dimer assay is intended for use in conjunction with a clinical pretest probability assessment model to exclude pulmonary embolism (PE) and deep venous thrombosis (DVT) in outpatients suspected of PE or DVT. The cut-off value is 0.50 ug/mL FEU.    For patients 50 years of age or older, the application of age-adjusted cut-off values for D-Dimer may increase the specificity without  significant effect on sensitivity. The literature suggested calculation age adjusted cut-off in ug/L = age in years x 10 ug/L. The results in this laboratory are reported as ug/mL rather than ug/L. The calculation for age adjusted cut off in ug/mL= age in years x 0.01 ug/mL. For example, the cut off for a 76 year old male is 76 x 0.01 ug/mL = 0.76 ug/mL (760 ug/L).    M Marcelino et al. Age adjusted D-dimer cut-off levels to rule out pulmonary embolism: The ADJUST-PE Study. SANDHYA 2014;311:5801-9278.; HJ Katya et al. Diagnostic accuracy of conventional or age adjusted D-dimer cutoff values in older patients with suspected venous thromboembolism. Systemic review and meta-analysis. BMJ 2013:346:f2492.   Basic metabolic panel     Status: Abnormal   Result Value Ref Range    Sodium 142 135 - 145 mmol/L    Potassium 4.0 3.4 - 5.3 mmol/L    Chloride 106 98 - 107 mmol/L    Carbon Dioxide (CO2) 25 22 - 29 mmol/L    Anion Gap 11 7 - 15 mmol/L    Urea Nitrogen 16.1 8.0 - 23.0 mg/dL    Creatinine 0.72 0.51 - 0.95 mg/dL    GFR Estimate >90 >60 mL/min/1.73m2    Calcium 9.3 8.8 - 10.2 mg/dL    Glucose 110 (H) 70 - 99 mg/dL   CBC with platelets and differential     Status: Abnormal   Result Value Ref Range    WBC Count 13.5 (H) 4.0 - 11.0 10e3/uL    RBC Count 3.85 3.80 - 5.20 10e6/uL    Hemoglobin 12.3 11.7 - 15.7 g/dL    Hematocrit 36.0 35.0 - 47.0 %    MCV 94 78 - 100 fL    MCH 31.9 26.5 - 33.0 pg    MCHC 34.2 31.5 - 36.5 g/dL    RDW 12.1 10.0 - 15.0 %    Platelet Count 265 150 - 450 10e3/uL    % Neutrophils 66 %    % Lymphocytes 26 %    % Monocytes 8 %    % Eosinophils 0 %    % Basophils 0 %    % Immature Granulocytes 0 %    NRBCs per 100 WBC 0 <1 /100    Absolute Neutrophils 8.8 (H) 1.6 - 8.3 10e3/uL    Absolute Lymphocytes 3.5 0.8 - 5.3 10e3/uL    Absolute Monocytes 1.0 0.0 - 1.3 10e3/uL    Absolute Eosinophils 0.0 0.0 - 0.7 10e3/uL    Absolute Basophils 0.0 0.0 - 0.2 10e3/uL    Absolute Immature Granulocytes 0.1 <=0.4 10e3/uL     Absolute NRBCs 0.0 10e3/uL   EKG 12 lead     Status: None   Result Value Ref Range    Systolic Blood Pressure  mmHg    Diastolic Blood Pressure  mmHg    Ventricular Rate 47 BPM    Atrial Rate 47 BPM    DC Interval 128 ms    QRS Duration 72 ms     ms    QTc 396 ms    P Axis 61 degrees    R AXIS 32 degrees    T Axis 33 degrees    Interpretation ECG       Sinus bradycardia  Otherwise normal ECG  Unconfirmed report - interpretation of this ECG is computer generated - see medical record for final interpretation  Confirmed by - EMERGENCY ROOM, PHYSICIAN ([id number]),  MILLICENT MORELOS ([id number]) on 3/28/2024 8:37:00 PM     CBC with platelets differential     Status: Abnormal    Narrative    The following orders were created for panel order CBC with platelets differential.  Procedure                               Abnormality         Status                     ---------                               -----------         ------                     CBC with platelets and d...[[id number]]  Abnormal            Final result                 Please view results for these tests on the individual orders.     Medications   iopamidol (ISOVUE-370) solution 55 mL (55 mLs Intravenous $Given 3/28/24 2232)   sodium chloride (PF) 0.9% PF flush 86 mL (86 mLs Intracatheter $Given 3/28/24 2232)     Labs Ordered and Resulted from Time of ED Arrival to Time of ED Departure   D DIMER QUANTITATIVE - Abnormal       Result Value    D-Dimer Quantitative 0.54 (*)    BASIC METABOLIC PANEL - Abnormal    Sodium 142      Potassium 4.0      Chloride 106      Carbon Dioxide (CO2) 25      Anion Gap 11      Urea Nitrogen 16.1      Creatinine 0.72      GFR Estimate >90      Calcium 9.3      Glucose 110 (*)    CBC WITH PLATELETS AND DIFFERENTIAL - Abnormal    WBC Count 13.5 (*)     RBC Count 3.85      Hemoglobin 12.3      Hematocrit 36.0      MCV 94      MCH 31.9      MCHC 34.2      RDW 12.1      Platelet Count 265      % Neutrophils 66      % Lymphocytes 26       % Monocytes 8      % Eosinophils 0      % Basophils 0      % Immature Granulocytes 0      NRBCs per 100 WBC 0      Absolute Neutrophils 8.8 (*)     Absolute Lymphocytes 3.5      Absolute Monocytes 1.0      Absolute Eosinophils 0.0      Absolute Basophils 0.0      Absolute Immature Granulocytes 0.1      Absolute NRBCs 0.0     TROPONIN T, HIGH SENSITIVITY - Normal    Troponin T, High Sensitivity 9       CT Chest Pulmonary Embolism w Contrast   Final Result   IMPRESSION:   1.  No CT evidence of pulmonary embolism.   2.  Mild to moderate atelectasis or consolidation posterior to the left hemidiaphragm in the left lower lung. Clinical correlation for related symptomatology recommended.   3.  Small amount of pericardial effusion along the anterior aspect of the ascending thoracic aorta in the pericardial recess. No evidence for ascending thoracic aortic aneurysm.         US Lower Extremity Venous Duplex Right   Preliminary Result   RESIDENT PRELIMINARY INTERPRETATION   IMPRESSION:   No evidence of right lower extremity deep venous thrombosis.         XR Chest 1 View   Final Result   IMPRESSION: Heart size and vascularity are normal. Calcified lymph nodes right hilar region. Left basilar subsegmental atelectasis. No focal consolidation nor pleural effusion.             Critical care was not performed.     Medical Decision Making  The patient's presentation was of moderate complexity (2 or more stable chronic illnesses).    The patient's evaluation involved:  an assessment requiring an independent historian (see separate area of note for details)    The patient's management necessitated moderate risk (prescription drug management including medications given in the ED).    Assessment & Plan    Here in the emergency room patient with a CT scan showing a D-dimer of 0.54, white blood cell count of 13.5, CT scan showing possible pneumonia of the right lobe as well as a small pericardial effusion.  Given the fact patient has  no region of pericardial effusion, consulted thoracic surgery  Patient signed out to incoming  physician pending thoracic surgery Recs    I have reviewed the nursing notes. I have reviewed the findings, diagnosis, plan and need for follow up with the patient.    New Prescriptions    DOXYCYCLINE HYCLATE (VIBRAMYCIN) 100 MG CAPSULE    Take 1 capsule (100 mg) by mouth 2 times daily for 7 days       Final diagnoses:   Shortness of breath   Community acquired pneumonia, unspecified laterality   IAdelita, am serving as a trained medical scribe to document services personally performed by Eliel Whaley MD, based on the provider's statements to me.     IEliel MD, was physically present and have reviewed and verified the accuracy of this note documented by Adelita Beach.     Eliel Whaley MD  [facility] EMERGENCY DEPARTMENT  3/28/2024     Eliel Whaley MD  03/29/24 0114

## 2024-03-29 NOTE — ED TRIAGE NOTES
Pt arrives ambulatory to ED with C/O SOB and right calf pain that started this evening after a walk, pt had surgical procedure to left shoulder yesterday, A/O x 4, afebrile, VSS     Triage Assessment (Adult)       Row Name 03/28/24 1924          Triage Assessment    Airway WDL WDL        Respiratory WDL    Respiratory WDL X;all     Rhythm/Pattern, Respiratory shortness of breath        Skin Circulation/Temperature WDL    Skin Circulation/Temperature WDL WDL        Cardiac WDL    Cardiac WDL WDL        Peripheral/Neurovascular WDL    Peripheral Neurovascular WDL WDL        Cognitive/Neuro/Behavioral WDL    Cognitive/Neuro/Behavioral WDL WDL

## 2024-03-29 NOTE — UTILIZATION REVIEW
"  Admission Status; Secondary Review Determination         Under the authority of the Utilization Management Committee, the utilization review process indicated a secondary review on the above patient.  The review outcome is based on review of the medical records, discussions with staff, and applying clinical experience noted on the date of the review.          (x) Observation Status Appropriate - This patient does not meet hospital inpatient criteria and is placed in observation status. If this patient's primary payer is Medicare and was admitted as an inpatient, Condition Code 44 should be used and patient status changed to \"observation\".     RATIONALE FOR DETERMINATION     The severity of illness, intensity of service provided, expected LOS and risk for adverse outcome make the care appropriate for further observation; however, doesn't meet criteria for hospital inpatient admission.   notified of this determination.    Patient is a 63-year-old female admitted on 3/28/2024.  Patient had undergone a left-sided arthroscopic rotator cuff repair on 3/27/2024.  She came to the ED because of shortness of breath and leg pain.  CT showed a pulmonary embolus and a new small pericardial effusion along the anterior aspect of the ascending thoracic aorta.  White blood cell count was 13.5.  CT a did show a left lower lobe consolidation consistent with pneumonia.  Chest x-ray shows a left basilar subsegmental atelectasis.  Review of notes in the chart does not show agreement on diagnosis of community-acquired pneumonia or pneumonia secondary to recent procedure with possible aspiration.  Therefore antibiotics were not started.  The patient is being discharged today after single midnight stay.  Therefore, the patient's status is appropriate for observation rather than inpatient.  Dr. Davis will be notified via [employer] text of this recommendation.      The information on this document is developed by the " utilization review team in order for the business office to ensure compliance.  This only denotes the appropriateness of proper admission status and does not reflect the quality of care rendered.         The definitions of Inpatient Status and Observation Status used in making the determination above are those provided in the CMS Coverage Manual, Chapter 1 and Chapter 6, section 70.4.      Sincerely,     Dev Lopez MD  Physician Advisor  Utilization Review/ Case Management  [facility].

## 2024-03-29 NOTE — PLAN OF CARE
Assessment completed, A&O x4, follows commands, clear and logical, perrla. Lung sounds are clear, NSR, normotensive, GI WDL, voids adequately and spontaneously in toilet. LUE remains in sling from rotator cuff surgery PTA. Patient expressed strong desire to leave AMA. CVICU team paged and arrived at bedside. Patient deescalated and agreed to wait for formal discharge.     Discharged to: home at 1050  Belongings: All belongings sent home with patient.   AVS (After Visit Summary) discussed with: patient    All questions answered. Educated on follow up appointment and need to have CTA performed.     IV removed prior to discharge.      Stephenie Anderson RN

## 2024-03-29 NOTE — PROGRESS NOTES
"Major Shift Events: Neuros intact. Pt calm and cooperative. Up with SBA. Afebrile. Syst goal < 160 met without intervention. Sinus rhythm to sinus ivan, HR: 50-60s. Tolerating RA. Pt reporting that breathing feels shallow but otherwise denies shortness of breath. Reporting \"mild\" pain under L breast with deep inspiration, CVTS provider aware. L arm sling in place and L shoulder dressing clean and intact, pt denies L shoulder or L arm pain. Voids spontaneously. PIV x1.     Plan: Continue to follow POC and notify team of any changes.       For vital signs and complete assessments, please see documentation flowsheets.     "

## 2024-04-19 ENCOUNTER — ANCILLARY PROCEDURE (OUTPATIENT)
Dept: CT IMAGING | Facility: CLINIC | Age: 64
End: 2024-04-19
Attending: NURSE PRACTITIONER
Payer: COMMERCIAL

## 2024-04-19 DIAGNOSIS — I31.39 PERICARDIAL EFFUSION: ICD-10-CM

## 2024-04-19 PROCEDURE — 71275 CT ANGIOGRAPHY CHEST: CPT | Performed by: RADIOLOGY

## 2024-04-19 PROCEDURE — 74175 CTA ABDOMEN W/CONTRAST: CPT | Performed by: RADIOLOGY

## 2024-04-19 RX ORDER — IOPAMIDOL 755 MG/ML
100 INJECTION, SOLUTION INTRAVASCULAR ONCE
Status: COMPLETED | OUTPATIENT
Start: 2024-04-19 | End: 2024-04-19

## 2024-04-19 RX ADMIN — IOPAMIDOL 100 ML: 755 INJECTION, SOLUTION INTRAVASCULAR at 12:15

## 2024-04-19 NOTE — DISCHARGE INSTRUCTIONS

## 2024-05-08 ENCOUNTER — OFFICE VISIT (OUTPATIENT)
Dept: CARDIOLOGY | Facility: CLINIC | Age: 64
End: 2024-05-08
Attending: SURGERY
Payer: COMMERCIAL

## 2024-05-08 VITALS
WEIGHT: 177.8 LBS | BODY MASS INDEX: 27.85 KG/M2 | SYSTOLIC BLOOD PRESSURE: 134 MMHG | DIASTOLIC BLOOD PRESSURE: 90 MMHG | OXYGEN SATURATION: 96 % | HEART RATE: 74 BPM

## 2024-05-08 DIAGNOSIS — I31.39 IDIOPATHIC PERICARDIAL EFFUSION: Primary | ICD-10-CM

## 2024-05-08 PROCEDURE — 99213 OFFICE O/P EST LOW 20 MIN: CPT | Performed by: SURGERY

## 2024-05-08 PROCEDURE — 99204 OFFICE O/P NEW MOD 45 MIN: CPT | Performed by: SURGERY

## 2024-05-08 ASSESSMENT — PAIN SCALES - GENERAL: PAINLEVEL: NO PAIN (0)

## 2024-05-08 NOTE — PROGRESS NOTES
Cardiac and Thoracic Surgery Consultation      Carrie Garcia MRN# [id number]   YOB: 1960 Age: 63 year old        Reason for consult: I was asked by Dr. Emanuel Soliz to evaluate this patient for a periaortic fluid collection near the distal ascending aorta/aortic arch in the aortic recess of the pericardial space.           Assessment and Plan:   Ms. Carrie Garcia is a 63-year-old woman with a periaortic fluid collection near the distal ascending aorta/aortic arch in the aortic recess of the pericardial space. The diagnosis is not clear but the differential diagnosis has included aortic dissection, aortic intramural hematoma, aortitis, and pericarditis. I am confident that this is not an aortic dissection or intramural hematoma. Aortitis is unlikely given that it is such a small area. Pericarditis also seems unlikely since there is no fluid anywhere else in the pericardium. In any event, she is now asymptomatic and this CT finding is of no functional consequence. No further follow up is needed.             Chief Complaint:   Ms. Carrie Garcia is a 63-year-old woman with a periaortic fluid collection near the distal ascending aorta/aortic arch in the aortic recess of the pericardial space. She presented with chest heaviness and dyspnea which has since resolved.    History is obtained from the patient         History of Present Illness:   This patient is a 63 year old female who presents with chest heaviness and dyspnea which has since resolved. She has also had pleuritic chest pain localized to inferior left breast, and right calf pain. Of note, she was 2 days status post left shoulder surgery for a torn rotator cuff at that time and she had a preoperative shoulder block. At the time of this presentation for dyspnea and chest pain, she was found to be hypertensive to 170s. LE US was negative for DVT. CT chest was negative for PE but showed a small pericardial effusion along the anterior aspect of the  ascending thoracic aorta. A CT angio was done the next morning and was negative for aortic dissection, but re-demonstrated the fluid in the superior aortic recess and showed small left lower lobe opacification. By that morning her shortness of breath and pleuritic pain had resolved.                 Past Medical History:   No past medical history on file.          Past Surgical History:   No past surgical history on file.            Social History:     Social History     Tobacco Use    Smoking status: Never    Smokeless tobacco: Never   Substance Use Topics    Alcohol use: Yes     Alcohol/week: 1.0 standard drink of alcohol     Types: 1 Glasses of wine per week     Comment: 1 per week             Family History:   No family history on file.          Immunizations:     Immunization History   Administered Date(s) Administered    COVID-19 MONOVALENT 12+ (Pfizer) 04/07/2021, 05/05/2021    COVID-19 Monovalent 18+ (Moderna) 11/22/2021, 08/20/2022             Allergies:   No Known Allergies          Medications:     No current outpatient medications on file.     No current facility-administered medications for this visit.             Review of Systems:     The 10 point Review of Systems is negative other than noted in the HPI            Physical Exam:   Vitals were reviewed      BP: (!) 134/90 Pulse: 74     SpO2: 96 %      Constitutional:   awake, alert, cooperative, no apparent distress, and appears stated age     Eyes:   lids and lashes normal, sclera clear, and conjunctiva normal     ENT:   normocephalic, without obvious abnormality     Neck:   supple, symmetrical, trachea midline     Lungs:   no increased work of breathing, good air exchange, and no retractions     Cardiovascular:   regular rate and rhythm     Abdomen:   non-distended     Musculoskeletal:   no lower extremity pitting edema present  full range of motion noted  motor strength is 5 out of 5 all extremities bilaterally     Neurologic:   Mental Status Exam:   Level of Alertness:   awake  Orientation:   person, place, time  Memory:   normal  Cranial Nerves:  cranial nerves II-XII are grossly intact  Motor Exam:  moves all extremities well and symmetrically     Neuropsychiatric:   General: normal, calm, and normal eye contact  Level of consciousness: alert / normal  Affect: normal     Skin:   no bruising or bleeding, normal skin color, texture, turgor, and no redness, warmth, or swelling          Data:   All laboratory data reviewed  All cardiac studies reviewed by me.  All imaging studies reviewed by me.    CTA CHEST/ABDOMEN/PELVIS:  FINDINGS: CTA: Persistent non specific fluid in the superior aortic  recess. Slightly smaller volume. 10 mm thickness between the pulmonary  artery and left superior pulmonary vein, previously 13 mm.     Patent aorta. Right innominate, subclavian, and carotid, left carotid,  and left subclavian arteries patent. Bilateral vertebral arteries  patent.     Celiac, superior mesenteric, single right and two left renal, and  inferior mesenteric arteries patent. Circumaortic left renal vein.     Bilateral common, internal, and external iliac arteries patent. Right  arterial and venous corona mortis. Left venous corona mortis.  Bilateral common femoral arteries patent.     Aortic measurements:       Sinuses of Valsalva: 32 mm       Sinotubular junction: 28 mm       Ascendin mm       Arch: 29 mm       Proximal descendin mm       Mid descendin mm       Diaphragm: 25 mm       Infrarenal: 19 mm       Above the bifurcation: 19 mm     CT: Left lower lobe consolidation cleared. Atelectasis.     Renal cysts.     Right adnexal 25 mm cystic structure, possibly there in the previous  study but not as pronounced due to contrast timing.     Spine degenerative changes.                                                                      IMPRESSION:  1. Persistent non specific fluid in the superior aortic recess.  Slightly smaller volume.     2. Right  adnexal 25 mm cystic structure. If patient is post  menopausal, consider further evaluation with ultrasound or MR.

## 2024-05-08 NOTE — LETTER
5/8/2024      RE: Carrie Garcia  [address] [facility] [address]       Dear Colleague,    Thank you for the opportunity to participate in the care of your patient, Carrie Garcia, at the [facility] [place] [facility]. Please see a copy of my visit note below.    Cardiac and Thoracic Surgery Consultation      Carrie Garcia MRN# [id number]   YOB: 1960 Age: 63 year old        Reason for consult: I was asked by Dr. Emanuel Soliz to evaluate this patient for a periaortic fluid collection near the distal ascending aorta/aortic arch in the aortic recess of the pericardial space.           Assessment and Plan:   Ms. Carrie Garcia is a 63-year-old woman with a periaortic fluid collection near the distal ascending aorta/aortic arch in the aortic recess of the pericardial space. The diagnosis is not clear but the differential diagnosis has included aortic dissection, aortic intramural hematoma, aortitis, and pericarditis. I am confident that this is not an aortic dissection or intramural hematoma. Aortitis is unlikely given that it is such a small area. Pericarditis also seems unlikely since there is no fluid anywhere else in the pericardium. In any event, she is now asymptomatic and this CT finding is of no functional consequence. No further follow up is needed.             Chief Complaint:   Ms. Carrie Garcia is a 63-year-old woman with a periaortic fluid collection near the distal ascending aorta/aortic arch in the aortic recess of the pericardial space. She presented with chest heaviness and dyspnea which has since resolved.    History is obtained from the patient         History of Present Illness:   This patient is a 63 year old female who presents with chest heaviness and dyspnea which has since resolved. She has also had pleuritic chest pain localized to inferior left breast, and right calf pain. Of note, she was 2 days status  post left shoulder surgery for a torn rotator cuff at that time and she had a preoperative shoulder block. At the time of this presentation for dyspnea and chest pain, she was found to be hypertensive to 170s. LE US was negative for DVT. CT chest was negative for PE but showed a small pericardial effusion along the anterior aspect of the ascending thoracic aorta. A CT angio was done the next morning and was negative for aortic dissection, but re-demonstrated the fluid in the superior aortic recess and showed small left lower lobe opacification. By that morning her shortness of breath and pleuritic pain had resolved.                 Past Medical History:   No past medical history on file.          Past Surgical History:   No past surgical history on file.            Social History:     Social History     Tobacco Use     Smoking status: Never     Smokeless tobacco: Never   Substance Use Topics     Alcohol use: Yes     Alcohol/week: 1.0 standard drink of alcohol     Types: 1 Glasses of wine per week     Comment: 1 per week             Family History:   No family history on file.          Immunizations:     Immunization History   Administered Date(s) Administered     COVID-19 MONOVALENT 12+ (Pfizer) 04/07/2021, 05/05/2021     COVID-19 Monovalent 18+ (Moderna) 11/22/2021, 08/20/2022             Allergies:   No Known Allergies          Medications:     No current outpatient medications on file.     No current facility-administered medications for this visit.             Review of Systems:     The 10 point Review of Systems is negative other than noted in the HPI            Physical Exam:   Vitals were reviewed      BP: (!) 134/90 Pulse: 74     SpO2: 96 %      Constitutional:   awake, alert, cooperative, no apparent distress, and appears stated age     Eyes:   lids and lashes normal, sclera clear, and conjunctiva normal     ENT:   normocephalic, without obvious abnormality     Neck:   supple, symmetrical, trachea midline      Lungs:   no increased work of breathing, good air exchange, and no retractions     Cardiovascular:   regular rate and rhythm     Abdomen:   non-distended     Musculoskeletal:   no lower extremity pitting edema present  full range of motion noted  motor strength is 5 out of 5 all extremities bilaterally     Neurologic:   Mental Status Exam:  Level of Alertness:   awake  Orientation:   person, place, time  Memory:   normal  Cranial Nerves:  cranial nerves II-XII are grossly intact  Motor Exam:  moves all extremities well and symmetrically     Neuropsychiatric:   General: normal, calm, and normal eye contact  Level of consciousness: alert / normal  Affect: normal     Skin:   no bruising or bleeding, normal skin color, texture, turgor, and no redness, warmth, or swelling          Data:   All laboratory data reviewed  All cardiac studies reviewed by me.  All imaging studies reviewed by me.    CTA CHEST/ABDOMEN/PELVIS:  FINDINGS: CTA: Persistent non specific fluid in the superior aortic  recess. Slightly smaller volume. 10 mm thickness between the pulmonary  artery and left superior pulmonary vein, previously 13 mm.     Patent aorta. Right innominate, subclavian, and carotid, left carotid,  and left subclavian arteries patent. Bilateral vertebral arteries  patent.     Celiac, superior mesenteric, single right and two left renal, and  inferior mesenteric arteries patent. Circumaortic left renal vein.     Bilateral common, internal, and external iliac arteries patent. Right  arterial and venous corona mortis. Left venous corona mortis.  Bilateral common femoral arteries patent.     Aortic measurements:       Sinuses of Valsalva: 32 mm       Sinotubular junction: 28 mm       Ascendin mm       Arch: 29 mm       Proximal descendin mm       Mid descendin mm       Diaphragm: 25 mm       Infrarenal: 19 mm       Above the bifurcation: 19 mm     CT: Left lower lobe consolidation cleared. Atelectasis.     Renal  cysts.     Right adnexal 25 mm cystic structure, possibly there in the previous  study but not as pronounced due to contrast timing.     Spine degenerative changes.                                                                      IMPRESSION:  1. Persistent non specific fluid in the superior aortic recess.  Slightly smaller volume.     2. Right adnexal 25 mm cystic structure. If patient is post  menopausal, consider further evaluation with ultrasound or MR.      Please do not hesitate to contact me if you have any questions/concerns.     Sincerely,     Niall Reyes MD

## 2024-05-08 NOTE — NURSING NOTE
Chief Complaint   Patient presents with    Follow Up     Return CV surgery     Vitals were taken and medications reconciled.    Liam Orozco, EMT  8:59 AM     6 wks

## 2024-11-03 ENCOUNTER — HEALTH MAINTENANCE LETTER (OUTPATIENT)
Age: 64
End: 2024-11-03